# Patient Record
Sex: MALE | Race: WHITE | NOT HISPANIC OR LATINO | Employment: FULL TIME | URBAN - METROPOLITAN AREA
[De-identification: names, ages, dates, MRNs, and addresses within clinical notes are randomized per-mention and may not be internally consistent; named-entity substitution may affect disease eponyms.]

---

## 2017-12-01 ENCOUNTER — TELEPHONE (OUTPATIENT)
Dept: FAMILY MEDICINE | Facility: CLINIC | Age: 22
End: 2017-12-01

## 2017-12-01 NOTE — TELEPHONE ENCOUNTER
----- Message from Yamila Daniel sent at 12/1/2017  3:00 PM CST -----  Patient states that he will be a new patient and need to see the doctor/NP today for blood in urine.  Please call patient at [phone]

## 2017-12-01 NOTE — TELEPHONE ENCOUNTER
Left detailed message explaining there are no available appointments this afternoon. That we hold UC clinic on Saturday morning here, and should call in the morning for possible appointment. If he feels that he cannot wait until the morning he should seek UC this evening.

## 2017-12-04 ENCOUNTER — LAB VISIT (OUTPATIENT)
Dept: LAB | Facility: HOSPITAL | Age: 22
End: 2017-12-04
Attending: UROLOGY
Payer: COMMERCIAL

## 2017-12-04 ENCOUNTER — OFFICE VISIT (OUTPATIENT)
Dept: UROLOGY | Facility: CLINIC | Age: 22
End: 2017-12-04
Payer: COMMERCIAL

## 2017-12-04 ENCOUNTER — HOSPITAL ENCOUNTER (OUTPATIENT)
Dept: RADIOLOGY | Facility: HOSPITAL | Age: 22
Discharge: HOME | End: 2017-12-04
Attending: UROLOGY
Payer: COMMERCIAL

## 2017-12-04 VITALS
DIASTOLIC BLOOD PRESSURE: 66 MMHG | WEIGHT: 134.5 LBS | HEART RATE: 61 BPM | SYSTOLIC BLOOD PRESSURE: 119 MMHG | HEIGHT: 70 IN | BODY MASS INDEX: 19.26 KG/M2

## 2017-12-04 DIAGNOSIS — E29.1 HYPOGONADISM IN MALE: ICD-10-CM

## 2017-12-04 DIAGNOSIS — R31.0 HEMATURIA, GROSS: ICD-10-CM

## 2017-12-04 DIAGNOSIS — R31.0 HEMATURIA, GROSS: Primary | ICD-10-CM

## 2017-12-04 LAB
ANION GAP SERPL CALC-SCNC: 5 MMOL/L
BILIRUB UR QL STRIP: NEGATIVE
BUN SERPL-MCNC: 13 MG/DL
CALCIUM SERPL-MCNC: 9.2 MG/DL
CHLORIDE SERPL-SCNC: 105 MMOL/L
CLARITY UR REFRACT.AUTO: CLEAR
CO2 SERPL-SCNC: 29 MMOL/L
COLOR UR AUTO: YELLOW
CREAT SERPL-MCNC: 1 MG/DL
EST. GFR  (AFRICAN AMERICAN): >60 ML/MIN/1.73 M^2
EST. GFR  (NON AFRICAN AMERICAN): >60 ML/MIN/1.73 M^2
GLUCOSE SERPL-MCNC: 68 MG/DL
GLUCOSE UR QL STRIP: NEGATIVE
HGB UR QL STRIP: NEGATIVE
KETONES UR QL STRIP: NEGATIVE
LEUKOCYTE ESTERASE UR QL STRIP: NEGATIVE
NITRITE UR QL STRIP: NEGATIVE
PH UR STRIP: 5 [PH] (ref 5–8)
POTASSIUM SERPL-SCNC: 4.8 MMOL/L
PROT UR QL STRIP: NEGATIVE
SODIUM SERPL-SCNC: 139 MMOL/L
SP GR UR STRIP: 1.02 (ref 1–1.03)
URN SPEC COLLECT METH UR: NORMAL
UROBILINOGEN UR STRIP-ACNC: NEGATIVE EU/DL

## 2017-12-04 PROCEDURE — 87086 URINE CULTURE/COLONY COUNT: CPT

## 2017-12-04 PROCEDURE — 74178 CT ABD&PLV WO CNTR FLWD CNTR: CPT | Mod: 26,,, | Performed by: RADIOLOGY

## 2017-12-04 PROCEDURE — 84270 ASSAY OF SEX HORMONE GLOBUL: CPT

## 2017-12-04 PROCEDURE — 25500020 PHARM REV CODE 255

## 2017-12-04 PROCEDURE — 99999 PR PBB SHADOW E&M-EST. PATIENT-LVL III: CPT | Mod: PBBFAC,,, | Performed by: UROLOGY

## 2017-12-04 PROCEDURE — 36415 COLL VENOUS BLD VENIPUNCTURE: CPT

## 2017-12-04 PROCEDURE — 80048 BASIC METABOLIC PNL TOTAL CA: CPT

## 2017-12-04 PROCEDURE — 88112 CYTOPATH CELL ENHANCE TECH: CPT | Performed by: PATHOLOGY

## 2017-12-04 PROCEDURE — 74178 CT ABD&PLV WO CNTR FLWD CNTR: CPT | Mod: TC

## 2017-12-04 PROCEDURE — 88112 CYTOPATH CELL ENHANCE TECH: CPT | Mod: 26,,, | Performed by: PATHOLOGY

## 2017-12-04 PROCEDURE — 99203 OFFICE O/P NEW LOW 30 MIN: CPT | Mod: S$GLB,,, | Performed by: UROLOGY

## 2017-12-04 PROCEDURE — 81003 URINALYSIS AUTO W/O SCOPE: CPT

## 2017-12-04 RX ORDER — DEXTROAMPHETAMINE SACCHARATE, AMPHETAMINE ASPARTATE MONOHYDRATE, DEXTROAMPHETAMINE SULFATE AND AMPHETAMINE SULFATE 5; 5; 5; 5 MG/1; MG/1; MG/1; MG/1
20 CAPSULE, EXTENDED RELEASE ORAL EVERY MORNING
COMMUNITY
End: 2019-08-13

## 2017-12-04 RX ADMIN — IOHEXOL 75 ML: 350 INJECTION, SOLUTION INTRAVENOUS at 04:12

## 2017-12-04 NOTE — PROGRESS NOTES
"  Subjective:       Patient ID: Donal Black is a 22 y.o. male.    Chief Complaint:  Hematuria.      History of Present Illness  HPI  Patient is a 22 y.o. male who is new to our clinic and referred by urgent care for evaluation of hematuria.  He reports a 1-2 month of hematospermia.  He then subsequently developed hematuria.  He was evaluated at urgent care who told him he did not have an infection.  Onset of hematuria was Friday.  He had no dysuria.  He also had left testicular discomfort at the time.  Pain is mild--"uncomfortable".   No fevers.  No nausea/vomiting.  Mild left lower quadrant/groin pain.   Denies a h/o kidney stones.  He is a smoker---used to smoke 1ppd for ~10 years.  Now he mostly smokes e-cigarettes.       Of note, he has gynecomastia and previously had been on testosterone injection.  He says he was taking "prohormones" which he describes as essentially steroids.  He was also Novadex and arimidex.  He has not been on these for ~ 1year.        Review of Systems  Review of Systems  All other systems reviewed and negative except pertinent positives noted in HPI.       Objective:     Physical Exam   Nursing note and vitals reviewed.  Constitutional: He is oriented to person, place, and time. Vital signs are normal. He appears well-developed and well-nourished. He is cooperative.   HENT:   Head: Normocephalic.   Neck: No tracheal deviation present.   Cardiovascular: Normal rate and intact distal pulses.    Pulmonary/Chest: Effort normal. No accessory muscle usage. No tachypnea. No respiratory distress.   Abdominal: Soft. Normal appearance. He exhibits no distension, no fluid wave, no ascites and no mass. There is no tenderness. There is no rebound and no CVA tenderness. No hernia. Hernia confirmed negative in the right inguinal area and confirmed negative in the left inguinal area.   Liver/spleen non-palpable.   Genitourinary: Prostate normal, testes normal and penis normal. Rectal exam shows no " external hemorrhoid, no mass, no tenderness and anal tone normal. Prostate is not tender. Right testis shows no mass and no tenderness. Right testis is descended. Left testis shows no mass and no tenderness. Left testis is descended. Circumcised.   Genitourinary Comments: Scrotum showed no rashes or lesions.  Anus/perineum without lesion.  Epididymis showed no masses or tenderness. No meatal stenosis, meatus in normal location. No penile discharge/plaques. james deferred     Musculoskeletal: Normal range of motion.   Lymphadenopathy: No inguinal adenopathy noted on the right or left side.        Right: No inguinal adenopathy present.        Left: No inguinal adenopathy present.   Neurological: He is alert and oriented to person, place, and time. He has normal strength.   Skin: No bruising and no rash noted.     Psychiatric: He has a normal mood and affect. His speech is normal and behavior is normal. Thought content normal.       Lab Review  No results found for: COLORU, SPECGRAV, PHUR, WBCUR, NITRITE, PROTEINUR, GLUCOSEUR, KETONESU, UROBILINOGEN, BILIRUBINUR, RBCUR      Assessment:        1. Hematuria, gross          Plan:     Hematuria, gross    -The patient will be set up for a hematuria workup to include a CT urogram, urine cytology, cystoscopy and urine culture.  A BMP will be ordered if not done in the last 60 days.  The risks and benefits of cystoscopy were discussed with the patient.     -unfortunately, the patient is refusing cystoscopy at this time, even under general anesthesia.

## 2017-12-05 ENCOUNTER — TELEPHONE (OUTPATIENT)
Dept: UROLOGY | Facility: CLINIC | Age: 22
End: 2017-12-05

## 2017-12-05 LAB — BACTERIA UR CULT: NO GROWTH

## 2017-12-05 NOTE — TELEPHONE ENCOUNTER
----- Message from Gerard Diego LPN sent at 12/5/2017  9:24 AM CST -----  Contact: self  [phone]      ----- Message -----  From: Dolores Peck MA  Sent: 12/5/2017   8:54 AM  To: Julian Jay Staff    States he is calling for his test results.

## 2017-12-05 NOTE — TELEPHONE ENCOUNTER
Called patient and notified of the results.  I recommended cystoscopy--he will call if/when he is ready to schedule this.

## 2017-12-08 LAB
ALBUMIN SERPL-MCNC: 4.2 G/DL (ref 3.6–5.1)
SHBG SERPL-SCNC: 54 NMOL/L (ref 10–50)
TESTOST FREE SERPL-MCNC: 31.1 PG/ML (ref 46–224)
TESTOST SERPL-MCNC: 363 NG/DL (ref 250–1100)
TESTOSTERONE.FREE+WB SERPL-MCNC: 60 NG/DL (ref 110–575)

## 2019-08-03 ENCOUNTER — HOSPITAL ENCOUNTER (EMERGENCY)
Facility: HOSPITAL | Age: 24
Discharge: HOME | End: 2019-08-03
Attending: EMERGENCY MEDICINE
Payer: COMMERCIAL

## 2019-08-03 VITALS
HEIGHT: 70 IN | BODY MASS INDEX: 21.47 KG/M2 | OXYGEN SATURATION: 100 % | HEART RATE: 78 BPM | WEIGHT: 150 LBS | RESPIRATION RATE: 18 BRPM | SYSTOLIC BLOOD PRESSURE: 118 MMHG | TEMPERATURE: 98 F | DIASTOLIC BLOOD PRESSURE: 79 MMHG
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
## 2019-08-03 DIAGNOSIS — S62.366A CLOSED NONDISPLACED FRACTURE OF NECK OF FIFTH METACARPAL BONE OF RIGHT HAND, INITIAL ENCOUNTER: ICD-10-CM
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
## 2019-08-03 DIAGNOSIS — M25.519 SHOULDER PAIN: ICD-10-CM

## 2019-08-03 DIAGNOSIS — R07.9 CHEST PAIN: ICD-10-CM

## 2019-08-03 PROCEDURE — 99284 EMERGENCY DEPT VISIT MOD MDM: CPT

## 2019-08-03 PROCEDURE — 29125 APPL SHORT ARM SPLINT STATIC: CPT | Mod: RT

## 2019-08-03 PROCEDURE — 93005 ELECTROCARDIOGRAM TRACING: CPT

## 2019-08-03 NOTE — ED NOTES
Splint in place and rechecked by MD.  Brisk cap refill at site. Asks and given work note.  Ambulatory.
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
## 2019-08-12 ENCOUNTER — TELEPHONE (OUTPATIENT)
Dept: ORTHOPEDICS | Facility: CLINIC | Age: 24
End: 2019-08-12
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
## 2019-08-12 NOTE — TELEPHONE ENCOUNTER
Patient stated he is calling around for a sooner appointment. He has been instructed to reach out to us if he is unsuccessful. Patient verbalized understanding; no other issues discussed.
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
## 2019-08-13 ENCOUNTER — OFFICE VISIT (OUTPATIENT)
Dept: ORTHOPEDICS | Facility: CLINIC | Age: 24
End: 2019-08-13
Payer: COMMERCIAL

## 2019-08-13 VITALS
WEIGHT: 149 LBS | DIASTOLIC BLOOD PRESSURE: 70 MMHG | HEIGHT: 70 IN | HEART RATE: 118 BPM | BODY MASS INDEX: 21.33 KG/M2 | SYSTOLIC BLOOD PRESSURE: 118 MMHG

## 2019-08-13 DIAGNOSIS — S62.366D CLOSED NONDISPLACED FRACTURE OF NECK OF FIFTH METACARPAL BONE OF RIGHT HAND WITH ROUTINE HEALING, SUBSEQUENT ENCOUNTER: Primary | ICD-10-CM

## 2019-08-13 PROCEDURE — 99203 PR OFFICE/OUTPT VISIT, NEW, LEVL III, 30-44 MIN: ICD-10-PCS | Mod: S$GLB,,, | Performed by: ORTHOPAEDIC SURGERY

## 2019-08-13 PROCEDURE — 3008F BODY MASS INDEX DOCD: CPT | Mod: S$GLB,,, | Performed by: ORTHOPAEDIC SURGERY

## 2019-08-13 PROCEDURE — 3008F PR BODY MASS INDEX (BMI) DOCUMENTED: ICD-10-PCS | Mod: S$GLB,,, | Performed by: ORTHOPAEDIC SURGERY

## 2019-08-13 PROCEDURE — 99203 OFFICE O/P NEW LOW 30 MIN: CPT | Mod: S$GLB,,, | Performed by: ORTHOPAEDIC SURGERY
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
## 2019-08-13 NOTE — LETTER
August 13, 2019                 [facility]  [address]  [place] [address]  Phone: [phone]  Fax: [phone] August 13, 2019     Patient: Donal Black    YOB: 1995   Date of Visit: 8/13/2019       To Whom It May Concern:    It is my medical opinion that Donal Black  may return to full working duty immediately with no restrictions.    If you have any questions or concerns, please don't hesitate to call.    Sincerely,        Donald Peña MD

## 2019-08-22 ENCOUNTER — OFFICE VISIT (OUTPATIENT)
Dept: ORTHOPEDICS | Facility: CLINIC | Age: 24
End: 2019-08-22
Payer: COMMERCIAL

## 2019-08-22 VITALS
DIASTOLIC BLOOD PRESSURE: 74 MMHG | BODY MASS INDEX: 21.9 KG/M2 | SYSTOLIC BLOOD PRESSURE: 128 MMHG | WEIGHT: 153 LBS | HEIGHT: 70 IN | HEART RATE: 99 BPM

## 2019-08-22 DIAGNOSIS — S62.366D CLOSED NONDISPLACED FRACTURE OF NECK OF FIFTH METACARPAL BONE OF RIGHT HAND WITH ROUTINE HEALING, SUBSEQUENT ENCOUNTER: Primary | ICD-10-CM

## 2019-08-22 DIAGNOSIS — S63.591A TFCC (TRIANGULAR FIBROCARTILAGE COMPLEX) TEAR, RIGHT, INITIAL ENCOUNTER: ICD-10-CM

## 2019-08-22 DIAGNOSIS — G56.21 ULNAR NEURITIS, RIGHT: ICD-10-CM

## 2019-08-22 PROCEDURE — 99213 PR OFFICE/OUTPT VISIT, EST, LEVL III, 20-29 MIN: ICD-10-PCS | Mod: S$GLB,,, | Performed by: ORTHOPAEDIC SURGERY

## 2019-08-22 PROCEDURE — 3008F BODY MASS INDEX DOCD: CPT | Mod: S$GLB,,, | Performed by: ORTHOPAEDIC SURGERY

## 2019-08-22 PROCEDURE — 99213 OFFICE O/P EST LOW 20 MIN: CPT | Mod: S$GLB,,, | Performed by: ORTHOPAEDIC SURGERY

## 2019-08-22 PROCEDURE — 3008F PR BODY MASS INDEX (BMI) DOCUMENTED: ICD-10-PCS | Mod: S$GLB,,, | Performed by: ORTHOPAEDIC SURGERY
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
## 2019-08-22 NOTE — LETTER
August 22, 2019      [facility]  [address]  [place] [address]  Phone: [phone]  Fax: [phone]       Patient: Donal Black   YOB: 1995  Date of Visit: 08/22/2019    To Whom It May Concern:    Joann Black  was at our office on 08/22/2019. He may not return to work at this time.  If you have any questions or concerns, or if I can be of further assistance, please do not hesitate to contact me.    Sincerely,    Donald Peña MD

## 2019-08-26 ENCOUNTER — TELEPHONE (OUTPATIENT)
Dept: PHYSICAL MEDICINE AND REHAB | Facility: CLINIC | Age: 24
End: 2019-08-26

## 2019-08-26 NOTE — TELEPHONE ENCOUNTER
Attempted to call patient 8/22/19, 8/23/19, 8/26/19 to schedule an EMG.  Left messages on voicemail.

## 2019-08-28 ENCOUNTER — HOSPITAL ENCOUNTER (OUTPATIENT)
Dept: RADIOLOGY | Facility: HOSPITAL | Age: 24
Discharge: HOME | End: 2019-08-28
Attending: ORTHOPAEDIC SURGERY
Payer: COMMERCIAL

## 2019-08-28 DIAGNOSIS — S62.366D CLOSED NONDISPLACED FRACTURE OF NECK OF FIFTH METACARPAL BONE OF RIGHT HAND WITH ROUTINE HEALING, SUBSEQUENT ENCOUNTER: ICD-10-CM

## 2019-08-28 DIAGNOSIS — S63.591A TFCC (TRIANGULAR FIBROCARTILAGE COMPLEX) TEAR, RIGHT, INITIAL ENCOUNTER: ICD-10-CM

## 2019-08-28 DIAGNOSIS — G56.21 ULNAR NEURITIS, RIGHT: ICD-10-CM

## 2019-08-28 PROCEDURE — 73218 MRI UPPER EXTREMITY W/O DYE: CPT | Mod: TC,PO,RT

## 2019-10-01 ENCOUNTER — TELEPHONE (OUTPATIENT)
Dept: ORTHOPEDICS | Facility: CLINIC | Age: 24
End: 2019-10-01
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
## 2019-10-02 ENCOUNTER — TELEPHONE (OUTPATIENT)
Dept: ORTHOPEDICS | Facility: CLINIC | Age: 24
End: 2019-10-02

## 2019-10-02 NOTE — TELEPHONE ENCOUNTER
----- Message from Yaima Mathur sent at 10/1/2019 11:35 AM CDT -----  Contact: Patient   Please call patient he needs a return to work status. Patient has not had his NCV yet.

## 2019-10-02 NOTE — TELEPHONE ENCOUNTER
Spoke with pt, he never had his NCS yet, has another appointment on 10/28. Also needs a note stating why he was coming to us.

## 2019-10-29 ENCOUNTER — OFFICE VISIT (OUTPATIENT)
Dept: PHYSICAL MEDICINE AND REHAB | Facility: CLINIC | Age: 24
End: 2019-10-29
Payer: COMMERCIAL

## 2019-10-29 DIAGNOSIS — S63.591A TFCC (TRIANGULAR FIBROCARTILAGE COMPLEX) TEAR, RIGHT, INITIAL ENCOUNTER: ICD-10-CM

## 2019-10-29 DIAGNOSIS — G56.21 ULNAR NEURITIS, RIGHT: ICD-10-CM

## 2019-10-29 DIAGNOSIS — S62.366D CLOSED NONDISPLACED FRACTURE OF NECK OF FIFTH METACARPAL BONE OF RIGHT HAND WITH ROUTINE HEALING, SUBSEQUENT ENCOUNTER: ICD-10-CM

## 2019-10-29 PROCEDURE — 99499 NO LOS: ICD-10-PCS | Mod: S$GLB,,, | Performed by: PHYSICAL MEDICINE & REHABILITATION

## 2019-10-29 PROCEDURE — 95908 NRV CNDJ TST 3-4 STUDIES: CPT | Mod: S$GLB,,, | Performed by: PHYSICAL MEDICINE & REHABILITATION

## 2019-10-29 PROCEDURE — 95908 PR NERVE CONDUCTION STUDY; 3-4 STUDIES: ICD-10-PCS | Mod: S$GLB,,, | Performed by: PHYSICAL MEDICINE & REHABILITATION

## 2019-10-29 PROCEDURE — 99499 UNLISTED E&M SERVICE: CPT | Mod: S$GLB,,, | Performed by: PHYSICAL MEDICINE & REHABILITATION

## 2019-10-29 NOTE — LETTER
October 29, 2019      Donald Peña MD  [address]  [place] [address]           [facility]  [address] [place] [address]  [address]  [place] [address]  Phone: [phone]  Fax: [phone]          Patient: Donal Black   MR Number: [id number]   YOB: 1995   Date of Visit: 10/29/2019       Dear Dr. Donald Peña:    Thank you for referring Donal Black to me for evaluation. Attached you will find relevant portions of my assessment and plan of care.    If you have questions, please do not hesitate to call me. I look forward to following Donal Black along with you.    Sincerely,    Phyllis Beckham,     Enclosure  CC:  No Recipients    If you would like to receive this communication electronically, please contact [email or web address] or [phone] to request more information on [employer] access.    For providers and/or their staff who would like to refer a patient to [facility], please contact us through our one-stop-shop provider referral line, [facility] , at [phone].    If you feel you have received this communication in error or would no longer like to receive these types of communications, please e-mail [email or web address]

## 2019-10-29 NOTE — PROGRESS NOTES
[facility]  Physical Medicine and Rehabilitation   [address] [place] [address], [address]  [place] [address]             Patient: Donal Black   Patient ID: [id number]   Sex:     Date of Birth:     Age:     Notes:     Last visit date: 10/29/2019         Visit date and time: 10/29/2019 12:21   Patient Age on Visit Date:     Referring Physician:     Diagnoses:         Right hand numbness  Sensory NCS      Nerve / Sites Rec. Site Onset Lat Peak Lat Ref. NP Amp Ref. PP Amp Ref. Segments Distance Velocity     ms ms ms µV µV µV µV  cm m/s   R Median - Digit II (Antidromic)      Wrist Dig II 3.44 4.58 ?3.40 15.3 ?15.0 36.6 ?20.0 Wrist - Dig II 13 38   R Ulnar - Digit V (Antidromic)      Wrist Dig V 2.34 3.07 ?3.10 22.6 ?10.0 44.1 ?15.0 Wrist - Dig V 11 47       Motor NCS      Nerve / Sites Muscle Latency Ref. Amplitude Ref. Amp % Duration Segments Distance Lat Diff Velocity Ref.     ms ms mV mV % ms  cm ms m/s m/s   R Median - APB      Wrist APB 4.38 ?4.40 8.5 ?4.0 100 8.13 Wrist - APB 7         Elbow APB 9.58  6.5  77 8.28 Elbow - Wrist 23 5.21 44 ?49   R Ulnar - ADM      Wrist ADM 3.33 ?3.60 10.4 ?5.0 100 6.46 Wrist - ADM 7         B.Elbow ADM 7.81  10.5  100 6.51 B.Elbow - Wrist 24.5 4.48 55 ?49      A.Elbow ADM 9.48  11.3  108 6.35 A.Elbow - B.Elbow 10 1.67 60 ?49       NOTE- Pt. declined needle EMG study   Summary    The motor conduction test was performed on 2 nerve(s). The results were normal in 1 nerve(s): L Ulnar - ADM. Results outside the specified normal range were found in 1 nerve(s), as follows:   In the L Median - APB study  o the take off velocity result was reduced for Elbow - Wrist segment    The sensory conduction test was performed on 2 nerve(s). The results were normal in 1 nerve(s): L Ulnar - Digit V (Antidromic). Results outside the specified normal range were found in 1 nerve(s), as follows:   In the L Median - Digit II (Antidromic) study  o the peak latency result was increased for  Wrist stimulation  o the peak amplitude result was reduced for Wrist stimulation          Conclusion:       Moderate right carpal tunnel syndrome      NOTE- Pt. declined needle EMG study           ____________________________  Phyllis Pendleton D.O.

## 2021-05-26 ENCOUNTER — OCCUPATIONAL HEALTH (OUTPATIENT)
Dept: URGENT CARE | Facility: CLINIC | Age: 26
End: 2021-05-26

## 2021-05-26 PROCEDURE — 99499 PHYSICAL - BASIC COMPLEXITY: ICD-10-PCS | Mod: S$GLB,,, | Performed by: EMERGENCY MEDICINE

## 2021-05-26 PROCEDURE — 80305 PR NON-DOT DRUG SCREENS: ICD-10-PCS | Mod: S$GLB,,, | Performed by: EMERGENCY MEDICINE

## 2021-05-26 PROCEDURE — 99499 UNLISTED E&M SERVICE: CPT | Mod: S$GLB,,, | Performed by: EMERGENCY MEDICINE

## 2021-05-26 PROCEDURE — 80305 DRUG TEST PRSMV DIR OPT OBS: CPT | Mod: S$GLB,,, | Performed by: EMERGENCY MEDICINE

## 2022-03-02 ENCOUNTER — OFFICE VISIT (OUTPATIENT)
Dept: FAMILY MEDICINE | Facility: HOSPITAL | Age: 27
End: 2022-03-02
Attending: FAMILY MEDICINE
Payer: MEDICAID

## 2022-03-02 ENCOUNTER — LAB VISIT (OUTPATIENT)
Dept: LAB | Facility: HOSPITAL | Age: 27
End: 2022-03-02
Attending: FAMILY MEDICINE
Payer: MEDICAID

## 2022-03-02 VITALS
WEIGHT: 175.06 LBS | HEIGHT: 70 IN | DIASTOLIC BLOOD PRESSURE: 89 MMHG | SYSTOLIC BLOOD PRESSURE: 146 MMHG | HEART RATE: 112 BPM | BODY MASS INDEX: 25.06 KG/M2

## 2022-03-02 DIAGNOSIS — R03.0 ELEVATED BLOOD PRESSURE READING WITHOUT DIAGNOSIS OF HYPERTENSION: ICD-10-CM

## 2022-03-02 DIAGNOSIS — Z11.59 ENCOUNTER FOR HEPATITIS C SCREENING TEST FOR LOW RISK PATIENT: ICD-10-CM

## 2022-03-02 DIAGNOSIS — Z11.4 ENCOUNTER FOR SCREENING FOR HIV: ICD-10-CM

## 2022-03-02 DIAGNOSIS — M77.01 MEDIAL EPICONDYLITIS OF RIGHT ELBOW: ICD-10-CM

## 2022-03-02 DIAGNOSIS — G56.01 CARPAL TUNNEL SYNDROME OF RIGHT WRIST: Primary | ICD-10-CM

## 2022-03-02 DIAGNOSIS — G56.01 CARPAL TUNNEL SYNDROME OF RIGHT WRIST: ICD-10-CM

## 2022-03-02 LAB
ALBUMIN SERPL BCP-MCNC: 4 G/DL (ref 3.5–5.2)
ALP SERPL-CCNC: 85 U/L (ref 55–135)
ALT SERPL W/O P-5'-P-CCNC: 45 U/L (ref 10–44)
ANION GAP SERPL CALC-SCNC: 11 MMOL/L (ref 8–16)
AST SERPL-CCNC: 44 U/L (ref 10–40)
BASOPHILS # BLD AUTO: 0.09 K/UL (ref 0–0.2)
BASOPHILS NFR BLD: 1.3 % (ref 0–1.9)
BILIRUB SERPL-MCNC: 0.3 MG/DL (ref 0.1–1)
BUN SERPL-MCNC: 10 MG/DL (ref 6–20)
CALCIUM SERPL-MCNC: 9.1 MG/DL (ref 8.7–10.5)
CHLORIDE SERPL-SCNC: 106 MMOL/L (ref 95–110)
CHOLEST SERPL-MCNC: 146 MG/DL (ref 120–199)
CHOLEST/HDLC SERPL: 3.2 {RATIO} (ref 2–5)
CO2 SERPL-SCNC: 24 MMOL/L (ref 23–29)
CREAT SERPL-MCNC: 1.4 MG/DL (ref 0.5–1.4)
DIFFERENTIAL METHOD: ABNORMAL
EOSINOPHIL # BLD AUTO: 0.4 K/UL (ref 0–0.5)
EOSINOPHIL NFR BLD: 6.3 % (ref 0–8)
ERYTHROCYTE [DISTWIDTH] IN BLOOD BY AUTOMATED COUNT: 12.3 % (ref 11.5–14.5)
EST. GFR  (AFRICAN AMERICAN): >60 ML/MIN/1.73 M^2
EST. GFR  (NON AFRICAN AMERICAN): >60 ML/MIN/1.73 M^2
ESTIMATED AVG GLUCOSE: 88 MG/DL (ref 68–131)
FOLATE SERPL-MCNC: 7.8 NG/ML (ref 4–24)
GLUCOSE SERPL-MCNC: 72 MG/DL (ref 70–110)
HBA1C MFR BLD: 4.7 % (ref 4–5.6)
HCT VFR BLD AUTO: 48.5 % (ref 40–54)
HDLC SERPL-MCNC: 46 MG/DL (ref 40–75)
HDLC SERPL: 31.5 % (ref 20–50)
HGB BLD-MCNC: 16.2 G/DL (ref 14–18)
IMM GRANULOCYTES # BLD AUTO: 0.06 K/UL (ref 0–0.04)
IMM GRANULOCYTES NFR BLD AUTO: 0.9 % (ref 0–0.5)
LDLC SERPL CALC-MCNC: 89.2 MG/DL (ref 63–159)
LYMPHOCYTES # BLD AUTO: 2.1 K/UL (ref 1–4.8)
LYMPHOCYTES NFR BLD: 30.5 % (ref 18–48)
MCH RBC QN AUTO: 31.8 PG (ref 27–31)
MCHC RBC AUTO-ENTMCNC: 33.4 G/DL (ref 32–36)
MCV RBC AUTO: 95 FL (ref 82–98)
MONOCYTES # BLD AUTO: 0.9 K/UL (ref 0.3–1)
MONOCYTES NFR BLD: 12.8 % (ref 4–15)
NEUTROPHILS # BLD AUTO: 3.3 K/UL (ref 1.8–7.7)
NEUTROPHILS NFR BLD: 48.2 % (ref 38–73)
NONHDLC SERPL-MCNC: 100 MG/DL
NRBC BLD-RTO: 0 /100 WBC
PLATELET # BLD AUTO: 326 K/UL (ref 150–450)
PMV BLD AUTO: 9.3 FL (ref 9.2–12.9)
POTASSIUM SERPL-SCNC: 4.5 MMOL/L (ref 3.5–5.1)
PROT SERPL-MCNC: 7 G/DL (ref 6–8.4)
RBC # BLD AUTO: 5.09 M/UL (ref 4.6–6.2)
SODIUM SERPL-SCNC: 141 MMOL/L (ref 136–145)
TRIGL SERPL-MCNC: 54 MG/DL (ref 30–150)
TSH SERPL DL<=0.005 MIU/L-ACNC: 0.58 UIU/ML (ref 0.4–4)
VIT B12 SERPL-MCNC: 1102 PG/ML (ref 210–950)
WBC # BLD AUTO: 6.78 K/UL (ref 3.9–12.7)

## 2022-03-02 PROCEDURE — 36415 COLL VENOUS BLD VENIPUNCTURE: CPT | Performed by: STUDENT IN AN ORGANIZED HEALTH CARE EDUCATION/TRAINING PROGRAM

## 2022-03-02 PROCEDURE — 87389 HIV-1 AG W/HIV-1&-2 AB AG IA: CPT | Performed by: STUDENT IN AN ORGANIZED HEALTH CARE EDUCATION/TRAINING PROGRAM

## 2022-03-02 PROCEDURE — 82746 ASSAY OF FOLIC ACID SERUM: CPT | Performed by: STUDENT IN AN ORGANIZED HEALTH CARE EDUCATION/TRAINING PROGRAM

## 2022-03-02 PROCEDURE — 83036 HEMOGLOBIN GLYCOSYLATED A1C: CPT | Performed by: STUDENT IN AN ORGANIZED HEALTH CARE EDUCATION/TRAINING PROGRAM

## 2022-03-02 PROCEDURE — 84443 ASSAY THYROID STIM HORMONE: CPT | Performed by: STUDENT IN AN ORGANIZED HEALTH CARE EDUCATION/TRAINING PROGRAM

## 2022-03-02 PROCEDURE — 86803 HEPATITIS C AB TEST: CPT | Performed by: STUDENT IN AN ORGANIZED HEALTH CARE EDUCATION/TRAINING PROGRAM

## 2022-03-02 PROCEDURE — 82607 VITAMIN B-12: CPT | Performed by: STUDENT IN AN ORGANIZED HEALTH CARE EDUCATION/TRAINING PROGRAM

## 2022-03-02 PROCEDURE — 85025 COMPLETE CBC W/AUTO DIFF WBC: CPT | Performed by: STUDENT IN AN ORGANIZED HEALTH CARE EDUCATION/TRAINING PROGRAM

## 2022-03-02 PROCEDURE — 99213 OFFICE O/P EST LOW 20 MIN: CPT | Performed by: STUDENT IN AN ORGANIZED HEALTH CARE EDUCATION/TRAINING PROGRAM

## 2022-03-02 PROCEDURE — 80053 COMPREHEN METABOLIC PANEL: CPT | Performed by: STUDENT IN AN ORGANIZED HEALTH CARE EDUCATION/TRAINING PROGRAM

## 2022-03-02 PROCEDURE — 80061 LIPID PANEL: CPT | Performed by: STUDENT IN AN ORGANIZED HEALTH CARE EDUCATION/TRAINING PROGRAM

## 2022-03-02 RX ORDER — GABAPENTIN 300 MG/1
300 CAPSULE ORAL NIGHTLY
Qty: 60 CAPSULE | Refills: 1 | Status: SHIPPED | OUTPATIENT
Start: 2022-03-02 | End: 2022-12-01

## 2022-03-02 NOTE — PROGRESS NOTES
"Progress Note  [facility] Family [facility]    Subjective:     Donal Black is a 26 y.o. year old male with PMHx of carpal tunnel on R who presented to clinic today to establish care. Patient reports that he is overall doing well. He does have concerns regarding carpal tunnel. Previously diagnosed via EMG on the R side. Continues to endorse electric/burning pain in the R wrist, R first three fingers, and radiating up about 1/3 up the forearm. Painful on palpation of the R wrist. Pain is significantly worse at night; needs to wake up and shake his hand out to make the pain resolve. He reports that he has used a brace consistently in the past without significant improvement. Patient also with occasional pain in the 4th and 5th fingers. Reports hx of trauma to the medial aspect of the R elbow in the past. Pain in the 4th and 5th fingers followed this. Patient also with concern regarding pain in the R shoulder. Describes a specific sore spot that worsens with physical activity. Patient recently returned to the gym after a long period of not working out. He reports that he has been trying different "peptides" to help with the pain. 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 Patient without any other concerns at this time.       Review of Systems   Constitutional: Negative for chills, fever and malaise/fatigue.   Respiratory: Negative for cough and shortness of breath.    Cardiovascular: Negative for chest pain.   Gastrointestinal: Negative for abdominal pain, constipation, diarrhea, nausea and vomiting.   Genitourinary: Negative for dysuria.   Musculoskeletal: Positive for myalgias.   Neurological: Negative for weakness and headaches.       Objective:     Vitals:    03/02/22 0906   BP: (!) 146/89   Pulse: (!) 112       Wt Readings from Last 1 Encounters:   03/02/22 79.4 kg (175 lb 0.7 oz)       Physical Exam  Constitutional:       General: He is not in acute distress.     Appearance: Normal appearance. He is not " ill-appearing.   HENT:      Head: Normocephalic and atraumatic.      Mouth/Throat:      Mouth: Mucous membranes are moist.   Eyes:      Extraocular Movements: Extraocular movements intact.      Pupils: Pupils are equal, round, and reactive to light.   Pulmonary:      Effort: Pulmonary effort is normal. No respiratory distress.   Abdominal:      General: Abdomen is flat. There is no distension.   Musculoskeletal:         General: Normal range of motion.      Cervical back: Normal range of motion.      Comments: + tinel, - phalen on R   Skin:     General: Skin is warm and dry.   Neurological:      Mental Status: He is alert and oriented to person, place, and time.         Assessment/Plan:     Donal was seen today for establish care.    Diagnoses and all orders for this visit:    Carpal tunnel syndrome of right wrist  -     gabapentin (NEURONTIN) 300 MG capsule; Take 1 capsule (300 mg total) by mouth every evening.  -     CBC Auto Differential; Future  -     TSH; Future  -     Hemoglobin A1C; Future  -     Vitamin B12; Future  -     Folate; Future  - Patient with previously diagnosed R carpal tunnel. Discussed using a brace. Discussed symptomatic treatment with NSAIDs/tylenol. Start on gabapentin as he felt that brace was not helpful. If fails gabapentin, can consider injection and/or referral to surgery.     Medial epicondylitis of right elbow        - Patient with symptoms and hx consistent with medial epicondylitis. Discussed symptomatic treatment.     Elevated blood pressure reading without diagnosis of hypertension  -     Comprehensive Metabolic Panel; Future  -     Lipid Panel; Future    Encounter for screening for HIV  -     HIV 1/2 Ag/Ab (4th Gen); Future    Encounter for hepatitis C screening test for low risk patient  -     Hepatitis C Antibody; Future        Follow up in about 4 weeks if symptoms worsen or fail to improve.    Case discussed with staff: Dr. Aidan Lozada MD PGY-3  [facility]   03/02/2022 9:36 AM    This note was partially created using [employer] Voice Recognition software. Typographical and content errors may occur with this process. While efforts are made to detect and correct such errors, in some cases errors will persist. For this reason, wording in this document should be considered in the proper context and not strictly verbatim.

## 2022-03-04 LAB
HCV AB SERPL QL IA: NEGATIVE
HIV 1+2 AB+HIV1 P24 AG SERPL QL IA: NEGATIVE

## 2022-06-07 ENCOUNTER — TELEPHONE (OUTPATIENT)
Dept: FAMILY MEDICINE | Facility: HOSPITAL | Age: 27
End: 2022-06-07
Payer: MEDICAID

## 2022-06-07 DIAGNOSIS — G56.01 CARPAL TUNNEL SYNDROME OF RIGHT WRIST: Primary | ICD-10-CM

## 2022-06-07 NOTE — TELEPHONE ENCOUNTER
----- Message from Annmarie Collins sent at 6/6/2022 10:39 AM CDT -----  Pt called in asking for a referral carpal tunnel surgery in [place], [place].Pt ask for a call back.

## 2022-06-07 NOTE — TELEPHONE ENCOUNTER
----- Message from Annmarie Collins sent at 6/6/2022 10:39 AM CDT -----  Pt called in asking for a referral carpal tunnel surgery in [place], [place].Pt ask for a call back.

## 2022-06-09 NOTE — TELEPHONE ENCOUNTER
Called patient to discuss. Was having severe neuro/psych side effects from gabapentin and didn't feel like it was helpful in controlling his symptoms. Put in referral for orthopedics for operative management.     Ethan Lozada MD PGY-3  \[facility]\"" Family Medicine   06/09/2022 5:01 PM

## 2022-06-10 ENCOUNTER — TELEPHONE (OUTPATIENT)
Dept: FAMILY MEDICINE | Facility: HOSPITAL | Age: 27
End: 2022-06-10
Payer: MEDICAID

## 2022-07-13 ENCOUNTER — HOSPITAL ENCOUNTER (EMERGENCY)
Facility: HOSPITAL | Age: 27
Discharge: HOME | End: 2022-07-13
Attending: EMERGENCY MEDICINE
Payer: MEDICAID

## 2022-07-13 VITALS
RESPIRATION RATE: 17 BRPM | HEIGHT: 71 IN | HEART RATE: 108 BPM | SYSTOLIC BLOOD PRESSURE: 165 MMHG | TEMPERATURE: 98 F | WEIGHT: 170 LBS | DIASTOLIC BLOOD PRESSURE: 99 MMHG | OXYGEN SATURATION: 100 % | BODY MASS INDEX: 23.8 KG/M2

## 2022-07-13 DIAGNOSIS — M25.511 SHOULDER PAIN, RIGHT: ICD-10-CM

## 2022-07-13 PROCEDURE — 99283 EMERGENCY DEPT VISIT LOW MDM: CPT

## 2022-07-13 RX ORDER — DICLOFENAC SODIUM 75 MG/1
75 TABLET, DELAYED RELEASE ORAL 2 TIMES DAILY
Qty: 14 TABLET | Refills: 0 | Status: SHIPPED | OUTPATIENT
Start: 2022-07-13 | End: 2022-12-01

## 2022-07-13 NOTE — Clinical Note
"Donal Florblanche Black was seen and treated in our emergency department on 7/13/2022.  He may return to work on 07/14/2022.  Light duty until cleared by an orthopedist.     If you have any questions or concerns, please don't hesitate to call.      Conner Bunn, LANN RN    "

## 2022-07-13 NOTE — ED PROVIDER NOTES
Encounter Date: 7/13/2022       History     Chief Complaint   Patient presents with    Shoulder Pain     Pt here with c/o right shoulder pain without trauma x 2 weeks with pain while raising above his shoulder and use. Pt reports Hx of nerve damage in the same elbow.      Patient here with reported right shoulder pain patient reports that he has been suffering from tendinitis carpal tunnel in the right arm elbow region states injury while working out does report that there was possible surgery in the future he denies any known injury to the shoulder began having pain in the anterior and posterior aspect of the right shoulder that does radiate down into the biceps region        Review of patient's allergies indicates:  No Known Allergies  No past medical history on file.  No past surgical history on file.  Family History   Problem Relation Age of Onset    Hypertension Father     Thyroid disease Mother      Social History     Tobacco Use    Smoking status: Current Some Day Smoker    Smokeless tobacco: Never Used   Substance Use Topics    Alcohol use: No    Drug use: No     Review of Systems   Musculoskeletal: Positive for arthralgias.   All other systems reviewed and are negative.      Physical Exam     Initial Vitals [07/13/22 0945]   BP Pulse Resp Temp SpO2   (!) 165/99 108 17 98.2 °F (36.8 °C) 100 %      MAP       --         Physical Exam    Constitutional: He appears well-developed and well-nourished. No distress.   HENT:   Head: Normocephalic and atraumatic.   Cardiovascular: Normal rate, regular rhythm and intact distal pulses.   Pulmonary/Chest: No respiratory distress.   Musculoskeletal:         General: Tenderness present. Normal range of motion.      Comments: Tenderness palpation at the anterior and posterior aspect of the right shoulder without palpable crepitance or deformity     Neurological: He is alert and oriented to person, place, and time.   Skin: Skin is warm and dry. Capillary refill takes  less than 2 seconds.         ED Course   Procedures  Labs Reviewed - No data to display       Imaging Results          X-Ray Shoulder Trauma Right (Final result)  Result time 07/13/22 10:57:45    Final result by Po Abdul MD (07/13/22 10:57:45)                 Narrative:    Reason: Shoulder pain.    FINDINGS:    Three views of the right shoulder show no fracture, dislocation, or destructive osseous lesion. Soft tissues are unremarkable.    IMPRESSION:    Unremarkable radiograph of the right shoulder.    Electronically signed by:  Po Abdul DO  7/13/2022 10:57 AM CDT Workstation: [id number]                               Medications - No data to display  Medical Decision Making:   ED Management:  Shoulder radiographs shows no acute abnormalities will place patient on diclofenac recommend he follow-up with his orthopedist for further evaluation moist heat to the area return to ER for any worsened symptoms or new symptoms                      Clinical Impression:   Final diagnoses:  [M25.511] Shoulder pain, right          ED Disposition Condition    Discharge Stable        ED Prescriptions     Medication Sig Dispense Start Date End Date Auth. Provider    diclofenac (VOLTAREN) 75 MG EC tablet Take 1 tablet (75 mg total) by mouth 2 (two) times daily. 14 tablet 7/13/2022  Ta Mensah MD        Follow-up Information    None          Ta Mensah MD  07/13/22 1112

## 2022-10-12 ENCOUNTER — OFFICE VISIT (OUTPATIENT)
Dept: ORTHOPEDICS | Facility: CLINIC | Age: 27
End: 2022-10-12
Payer: MEDICAID

## 2022-10-12 VITALS — BODY MASS INDEX: 26.18 KG/M2 | WEIGHT: 187 LBS | HEIGHT: 71 IN

## 2022-10-12 DIAGNOSIS — G56.21 CUBITAL TUNNEL SYNDROME ON RIGHT: ICD-10-CM

## 2022-10-12 DIAGNOSIS — Z01.818 PRE-OP TESTING: ICD-10-CM

## 2022-10-12 DIAGNOSIS — G56.01 CARPAL TUNNEL SYNDROME OF RIGHT WRIST: ICD-10-CM

## 2022-10-12 PROCEDURE — 99204 OFFICE O/P NEW MOD 45 MIN: CPT | Mod: S$PBB,,, | Performed by: ORTHOPAEDIC SURGERY

## 2022-10-12 PROCEDURE — 3008F PR BODY MASS INDEX (BMI) DOCUMENTED: ICD-10-PCS | Mod: CPTII,,, | Performed by: ORTHOPAEDIC SURGERY

## 2022-10-12 PROCEDURE — 99212 OFFICE O/P EST SF 10 MIN: CPT | Mod: PBBFAC,PN | Performed by: ORTHOPAEDIC SURGERY

## 2022-10-12 PROCEDURE — 99204 PR OFFICE/OUTPT VISIT, NEW, LEVL IV, 45-59 MIN: ICD-10-PCS | Mod: S$PBB,,, | Performed by: ORTHOPAEDIC SURGERY

## 2022-10-12 PROCEDURE — 3008F BODY MASS INDEX DOCD: CPT | Mod: CPTII,,, | Performed by: ORTHOPAEDIC SURGERY

## 2022-10-12 PROCEDURE — 3044F PR MOST RECENT HEMOGLOBIN A1C LEVEL <7.0%: ICD-10-PCS | Mod: CPTII,,, | Performed by: ORTHOPAEDIC SURGERY

## 2022-10-12 PROCEDURE — 99999 PR PBB SHADOW E&M-EST. PATIENT-LVL II: ICD-10-PCS | Mod: PBBFAC,,, | Performed by: ORTHOPAEDIC SURGERY

## 2022-10-12 PROCEDURE — 3044F HG A1C LEVEL LT 7.0%: CPT | Mod: CPTII,,, | Performed by: ORTHOPAEDIC SURGERY

## 2022-10-12 PROCEDURE — 99999 PR PBB SHADOW E&M-EST. PATIENT-LVL II: CPT | Mod: PBBFAC,,, | Performed by: ORTHOPAEDIC SURGERY

## 2022-10-12 PROCEDURE — 1159F PR MEDICATION LIST DOCUMENTED IN MEDICAL RECORD: ICD-10-PCS | Mod: CPTII,,, | Performed by: ORTHOPAEDIC SURGERY

## 2022-10-12 PROCEDURE — 1159F MED LIST DOCD IN RCRD: CPT | Mod: CPTII,,, | Performed by: ORTHOPAEDIC SURGERY

## 2022-10-12 RX ORDER — CEFAZOLIN SODIUM 2 G/50ML
2 SOLUTION INTRAVENOUS
Status: CANCELLED | OUTPATIENT
Start: 2022-10-12

## 2022-10-12 RX ORDER — PREGABALIN 75 MG/1
75 CAPSULE ORAL 2 TIMES DAILY
Qty: 60 CAPSULE | Refills: 2 | Status: SHIPPED | OUTPATIENT
Start: 2022-10-12 | End: 2023-02-08 | Stop reason: SDUPTHER

## 2022-10-12 NOTE — PROGRESS NOTES
CC:  Right cubital tunnel syndrome and right carpal tunnel syndrome        HPI:  Donal Black is a very pleasant 27 y.o. male with ongoing symptoms right hand and arm for the past several years   No history of trauma   He does work out in the gym however and has noticed a popping sensation in his right elbow for the past year so these symptoms do cause the numbness which radiates down into his right ring and small finger   He has been evaluated elsewhere for both carpal tunnel and cubital tunnel syndrome   He has tried splinting without long-term relief  He is considering surgery for the right arm            PAST MEDICAL HISTORY: History reviewed. No pertinent past medical history.  PAST SURGICAL HISTORY: History reviewed. No pertinent surgical history.  FAMILY HISTORY:   Family History   Problem Relation Age of Onset    Hypertension Father     Thyroid disease Mother      SOCIAL HISTORY:   Social History     Socioeconomic History    Marital status: Single   Tobacco Use    Smoking status: Some Days    Smokeless tobacco: Never   Substance and Sexual Activity    Alcohol use: No    Drug use: No       MEDICATIONS:   Current Outpatient Medications:     diclofenac (VOLTAREN) 75 MG EC tablet, Take 1 tablet (75 mg total) by mouth 2 (two) times daily., Disp: 14 tablet, Rfl: 0    gabapentin (NEURONTIN) 300 MG capsule, Take 1 capsule (300 mg total) by mouth every evening., Disp: 60 capsule, Rfl: 1  ALLERGIES:   Review of patient's allergies indicates:   Allergen Reactions    Gabapentin Hallucinations       Review of Systems:  Constitutional: no fever or chills  ENT: no nasal congestion or sore throat  Respiratory: no cough or shortness of breath  Cardiovascular: no chest pain or palpitations  Gastrointestinal: no nausea or vomiting, PUD, GERD, NSAID intolerance  Genitourinary: no hematuria or dysuria  Integument/Breast: no rash or pruritis  Hematologic/Lymphatic: no easy bruising or lymphadenopathy  Musculoskeletal: see  "HPI  Neurological: no seizures or tremors  Behavioral/Psych: no auditory or visual hallucinations      Physical Exam   Vitals:    10/12/22 1400   Weight: 84.8 kg (187 lb)   Height: 5' 11" (1.803 m)   PainSc:   5       Constitutional: Oriented to person, place, and time. Appears well-developed and well-nourished.   HENT:   Head: Normocephalic and atraumatic.   Nose: Nose normal.   Eyes: No scleral icterus.   Neck: Normal range of motion.   Cardiovascular: Normal rate and regular rhythm.    Pulses:       Radial pulses are 2+ on the right side, and 2+ on the left side.   Pulmonary/Chest: Effort normal and breath sounds normal.   Abdominal: Soft.   Neurological: Alert and oriented to person, place, and time.   Skin: Skin is warm.   Psychiatric: Normal mood and affect.     MUSCULOSKELETAL UPPER EXTREMITY:  Examination right arm right elbow has some mild tenderness over the cubital tunnel and a positive Tinel sign over the ulnar nerve   There does appear to be some slight snapping with flexion and extension of the elbow which may represent subluxation of the nerve or possibly the medial border of the triceps  Examination right hand demonstrates a positive Tinel sign and a positive Phalen's test range of motion wrist fingers full  strength slightly decreased no atrophy noted            Diagnostic Studies:  Previous nerve test reviewed showing right carpal tunnel syndrome from 2019        Assessment:  1. Right cubital tunnel syndrome with possible snapping of the nerve.      2. Right carpal tunnel syndrome    Plan:  Explained the nature of these 2 problems the patient   Because of the duration of symptoms I have recommended surgical treatment for both he is in agreement   Will set this up as combined procedure for right ulnar nerve transposition right elbow and right carpal tunnel release as an outpatient surgery      The risks and benefits of surgery were discussed with the patient today and they understand.  The " "consent was signed in the office for surgery.      Bill Camejo MD (Jay)  [facility]  Orthopedic Upper Extremity Surgery       "

## 2022-12-06 ENCOUNTER — TELEPHONE (OUTPATIENT)
Dept: ORTHOPEDICS | Facility: CLINIC | Age: 27
End: 2022-12-06
Payer: MEDICAID

## 2022-12-06 NOTE — TELEPHONE ENCOUNTER
----- Message from Bill Camejo Jr., MD sent at 12/6/2022  1:39 PM CST -----  No- one side only  ----- Message -----  From: Kristyn Pena MA  Sent: 12/6/2022   1:14 PM CST  To: Bill Camejo Jr., MD    Patient is having right ctr/ulnar procedure done tomorrow. He would like to know if he can his left done as well.

## 2022-12-06 NOTE — TELEPHONE ENCOUNTER
Spoke with patient. Informed him that per Dr Camejo, he will only be able to do one hand at a time. Patient stated that he was also  not sure on whether he wanted a nerve block or not . He stated he will decide tomorrow morning before his procedure.

## 2022-12-12 ENCOUNTER — NURSE TRIAGE (OUTPATIENT)
Dept: ADMINISTRATIVE | Facility: CLINIC | Age: 27
End: 2022-12-12
Payer: MEDICAID

## 2022-12-12 DIAGNOSIS — G56.20 CUBITAL TUNNEL SYNDROME, UNSPECIFIED LATERALITY: Primary | ICD-10-CM

## 2022-12-12 DIAGNOSIS — G56.20 CUBITAL TUNNEL SYNDROME, UNSPECIFIED LATERALITY: ICD-10-CM

## 2022-12-12 RX ORDER — PREGABALIN 75 MG/1
75 CAPSULE ORAL 2 TIMES DAILY
Qty: 60 CAPSULE | Refills: 2 | OUTPATIENT
Start: 2022-12-12 | End: 2023-06-12

## 2022-12-12 RX ORDER — OXYCODONE AND ACETAMINOPHEN 7.5; 325 MG/1; MG/1
1 TABLET ORAL EVERY 8 HOURS PRN
Qty: 40 TABLET | Refills: 0 | Status: SHIPPED | OUTPATIENT
Start: 2022-12-12 | End: 2023-03-15

## 2022-12-12 RX ORDER — OXYCODONE AND ACETAMINOPHEN 7.5; 325 MG/1; MG/1
1 TABLET ORAL EVERY 8 HOURS PRN
Qty: 40 TABLET | Refills: 0 | OUTPATIENT
Start: 2022-12-12

## 2022-12-12 NOTE — TELEPHONE ENCOUNTER
Pt reports pharmacy states they are waiting for authorization from MD office before pt can get his pain medication refilled, Pt advised to call office when open in the morning per protocol, Pt encouraged to call back with any worsening symptoms or questions and verbalized understanding.    Reason for Disposition   Caller requesting a CONTROLLED substance prescription refill (e.g., narcotics, ADHD medicines)    Protocols used: Medication Refill and Renewal Call-A-AH

## 2022-12-12 NOTE — TELEPHONE ENCOUNTER
Spoke with patient and informed patient that medication had been called in to his preferred pharmacy. Patient stated that he did receive a phone call from the the pharmacy and stated that a prior authorization was needed. I informed the patient that we did fill our the prior authorization. I also informed the patient that he may have to pay for his prescription, or his prescription may be delayed because he did receive a narcotic less than 7 days ago. All questions were answered and patient verbalized understanding.

## 2022-12-12 NOTE — TELEPHONE ENCOUNTER
Pt states post op  pain medication not working, out of medication, ibuprofen not working, ice not working. Pt states has been elevating. Per care advice, advised to discuss with pcp and callback by nurse within 1 hour. Advised pt to callback for any further questions or concerns.   Reason for Disposition   SEVERE post-op pain (e.g., excruciating, pain scale 8-10) that is not controlled with pain medications    Additional Information   Negative: Sounds like a life-threatening emergency to the triager   Negative: Bright red, wide-spread, sunburn-like rash   Negative: SEVERE headache and after spinal (epidural) anesthesia   Negative: Vomiting and persists > 4 hours   Negative: Vomiting and abdomen looks much more swollen than usual   Negative: Drinking very little and dehydration suspected (e.g., no urine > 12 hours, very dry mouth, very lightheaded)   Negative: Patient sounds very sick or weak to the triager   Negative: Sounds like a serious complication to the triager   Negative: Fever > 100.4 F (38.0 C)   Negative: Caller has URGENT question and triager unable to answer question    Protocols used: Post-Op Symptoms and Smvxrksin-H-OL

## 2022-12-13 NOTE — TELEPHONE ENCOUNTER
----- Message from Reyna Augustine sent at 12/13/2022  8:41 AM CST -----  Type:  Patient Returning Call    Who Called:Pt   Would the patient rather a call back or a response via Cass Artner? Call back   Best Call Back Number:[phone]  Additional Information: pt received a missed call  from [facility] and called back to see if it was Dr Camejo office calling in ref to medication the insurance would not cover .. pt do not have VM

## 2022-12-13 NOTE — TELEPHONE ENCOUNTER
Spoke with patient and informed patient that PA was closed on our end and he should be hearing from the pharmacy shortly. All questions answered and patient verbalized understanding.

## 2022-12-13 NOTE — TELEPHONE ENCOUNTER
Attempted to contact patient, no answer, but I was able to leave a voicemail. I also spoke with the pharmacy who stated that they we're waiting on Prior Authorization. Pharmacy also stated that a PA had to be completed because a pain medication of more than 45 tablets were requested in a 30 day period. I did inform the pharmacy that PA was completed on our end, and that the patient was informed that this may come up. All questions answered and verbalized understanding.

## 2022-12-16 ENCOUNTER — TELEPHONE (OUTPATIENT)
Dept: ORTHOPEDICS | Facility: CLINIC | Age: 27
End: 2022-12-16
Payer: MEDICAID

## 2022-12-16 DIAGNOSIS — R11.0 POSTOPERATIVE NAUSEA: Primary | ICD-10-CM

## 2022-12-16 DIAGNOSIS — Z98.890 POSTOPERATIVE NAUSEA: Primary | ICD-10-CM

## 2022-12-16 RX ORDER — ONDANSETRON 4 MG/1
4 TABLET, ORALLY DISINTEGRATING ORAL EVERY 12 HOURS PRN
Qty: 14 TABLET | Refills: 0 | Status: SHIPPED | OUTPATIENT
Start: 2022-12-16 | End: 2022-12-29 | Stop reason: SDUPTHER

## 2022-12-16 NOTE — TELEPHONE ENCOUNTER
Zofran sent to pharmacy. This may make his sleepy. Make sure he is eating with pain medications also.

## 2022-12-16 NOTE — TELEPHONE ENCOUNTER
Spoke with patient. Informed that Zofran was sent to pharmacy and per Tory Corley, medication may make him sleepy and to be sure to eat while taking pain meds. Patient verbalized understanding.

## 2022-12-16 NOTE — PROGRESS NOTES
"Subjective:      Patient ID: Donal Black is a 27 y.o. male.    Chief Complaint: Post-op Evaluation of the Right Arm (Patient presents today for his post-op visit for his right arm. )      WILMAR  (French)    He is here for a postop visit. He is s/p Right elbow ulnar nerve transposition subcutaneous and Right carpal tunnel release by Dr. Camejo on 12/7/22.     He has mild pain in right arm. No numbness or tingling! He is taking prn oxycodone.     Dr. Camejo has him out of work for 3 months postop per patient.     History reviewed. No pertinent past medical history.      Current Outpatient Medications:     ondansetron (ZOFRAN-ODT) 4 MG TbDL, Take 1 tablet (4 mg total) by mouth every 12 (twelve) hours as needed (nausea)., Disp: 14 tablet, Rfl: 0    oxyCODONE-acetaminophen (PERCOCET) 7.5-325 mg per tablet, Take 1 tablet by mouth every 8 (eight) hours as needed for Pain., Disp: 40 tablet, Rfl: 0    pregabalin (LYRICA) 75 MG capsule, Take 1 capsule (75 mg total) by mouth 2 (two) times daily., Disp: 60 capsule, Rfl: 2    HYDROcodone-acetaminophen (NORCO) 5-325 mg per tablet, Take 1 tablet by mouth every 4 (four) hours as needed for Pain. (Patient not taking: Reported on 12/19/2022), Disp: 30 tablet, Rfl: 0    Review of patient's allergies indicates:   Allergen Reactions    Gabapentin Hallucinations       Review of Systems   Constitutional: Negative for chills, fever, night sweats and weight gain.   Gastrointestinal:  Negative for bowel incontinence, nausea and vomiting.   Genitourinary:  Negative for bladder incontinence.   Neurological:  Negative for disturbances in coordination and loss of balance.         Objective:        Ht 5' 11" (1.803 m)   Wt 80.7 kg (178 lb)   BMI 24.83 kg/m²     Ortho/SPM Exam    Exam of right UE:   Right carpal tunnel incision is clean and dry with sutures intact.   No signs of infection.     Right elbow incision is clean and dry.  No signs of infection.     Limited ROM of right " hand/wrist. He cannot make a full fist and almost has full extension of fingers.     Limited ROM right elbow- he lacks full extension.     He is NVI distally with good capillary refill. Compartments are soft.         Assessment:       Encounter Diagnoses   Name Primary?    Cubital tunnel syndrome, unspecified laterality Yes    Carpal tunnel syndrome of right wrist     S/P carpal tunnel release           Plan:       Donal was seen today for post-op evaluation.    Diagnoses and all orders for this visit:    Cubital tunnel syndrome, unspecified laterality  -     Ambulatory referral/consult to Physical/Occupational Therapy; Future    Carpal tunnel syndrome of right wrist  -     Ambulatory referral/consult to Physical/Occupational Therapy; Future    S/P carpal tunnel release  -     Ambulatory referral/consult to Physical/Occupational Therapy; Future      He is doing well s/p above surgery. Following plan made:     - Sutures removed without complication. Reviewed wound care.   - Work on ROM of elbow/hand/wrist.   - OT for right elbow and hand. Orders to [facility].   - No lifting with right arm for now.   - He is OOW for 3 months postop.   - Follow up with Dr. Camejo in 4 weeks and prn.     Follow up in about 4 weeks (around 1/16/2023) for postop visit with Dr. Camejo.

## 2022-12-16 NOTE — TELEPHONE ENCOUNTER
----- Message from Pati Massey MA sent at 12/16/2022  2:38 PM CST -----  Nausea medication is being requested  ----- Message -----  From: Kristyn Pena MA  Sent: 12/16/2022   1:26 PM CST  To: Jory Pryor    Good Afternoon,    Patient is requesting nausea medication. The pain medication is making him nauseous.    ----- Message -----  From: Lakia Guy  Sent: 12/16/2022   1:17 PM CST  To: Bashir STUART Staff    Patient states the medicine Dr. Camejo prescribed is working for pain, but makes him nauseous. He asked if he can get something for nausea. Please contact him to assist. Thanks

## 2022-12-19 ENCOUNTER — OFFICE VISIT (OUTPATIENT)
Dept: ORTHOPEDICS | Facility: CLINIC | Age: 27
End: 2022-12-19
Payer: MEDICAID

## 2022-12-19 VITALS — BODY MASS INDEX: 24.92 KG/M2 | WEIGHT: 178 LBS | HEIGHT: 71 IN

## 2022-12-19 DIAGNOSIS — G56.20 CUBITAL TUNNEL SYNDROME, UNSPECIFIED LATERALITY: Primary | ICD-10-CM

## 2022-12-19 DIAGNOSIS — Z98.890 S/P CARPAL TUNNEL RELEASE: ICD-10-CM

## 2022-12-19 DIAGNOSIS — G56.01 CARPAL TUNNEL SYNDROME OF RIGHT WRIST: ICD-10-CM

## 2022-12-19 PROCEDURE — 99024 PR POST-OP FOLLOW-UP VISIT: ICD-10-PCS | Mod: ,,, | Performed by: PHYSICIAN ASSISTANT

## 2022-12-19 PROCEDURE — 3044F HG A1C LEVEL LT 7.0%: CPT | Mod: CPTII,,, | Performed by: PHYSICIAN ASSISTANT

## 2022-12-19 PROCEDURE — 99024 POSTOP FOLLOW-UP VISIT: CPT | Mod: ,,, | Performed by: PHYSICIAN ASSISTANT

## 2022-12-19 PROCEDURE — 99999 PR PBB SHADOW E&M-EST. PATIENT-LVL III: CPT | Mod: PBBFAC,,, | Performed by: PHYSICIAN ASSISTANT

## 2022-12-19 PROCEDURE — 99213 OFFICE O/P EST LOW 20 MIN: CPT | Mod: PBBFAC,PN | Performed by: PHYSICIAN ASSISTANT

## 2022-12-19 PROCEDURE — 1160F RVW MEDS BY RX/DR IN RCRD: CPT | Mod: CPTII,,, | Performed by: PHYSICIAN ASSISTANT

## 2022-12-19 PROCEDURE — 99999 PR PBB SHADOW E&M-EST. PATIENT-LVL III: ICD-10-PCS | Mod: PBBFAC,,, | Performed by: PHYSICIAN ASSISTANT

## 2022-12-19 PROCEDURE — 1159F MED LIST DOCD IN RCRD: CPT | Mod: CPTII,,, | Performed by: PHYSICIAN ASSISTANT

## 2022-12-19 PROCEDURE — 1160F PR REVIEW ALL MEDS BY PRESCRIBER/CLIN PHARMACIST DOCUMENTED: ICD-10-PCS | Mod: CPTII,,, | Performed by: PHYSICIAN ASSISTANT

## 2022-12-19 PROCEDURE — 3044F PR MOST RECENT HEMOGLOBIN A1C LEVEL <7.0%: ICD-10-PCS | Mod: CPTII,,, | Performed by: PHYSICIAN ASSISTANT

## 2022-12-19 PROCEDURE — 1159F PR MEDICATION LIST DOCUMENTED IN MEDICAL RECORD: ICD-10-PCS | Mod: CPTII,,, | Performed by: PHYSICIAN ASSISTANT

## 2022-12-19 PROCEDURE — 3008F PR BODY MASS INDEX (BMI) DOCUMENTED: ICD-10-PCS | Mod: CPTII,,, | Performed by: PHYSICIAN ASSISTANT

## 2022-12-19 PROCEDURE — 3008F BODY MASS INDEX DOCD: CPT | Mod: CPTII,,, | Performed by: PHYSICIAN ASSISTANT

## 2022-12-19 NOTE — PATIENT INSTRUCTIONS
It was nice to meet you today!    You can get incisions wet. Okay to shower/wash hands.     You can work on moving your hand/wrist/elbow. No lifting with right arm yet.     I sent occupational therapy orders to [facility]. They should call you to schedule. You can call them at [phone] if needed.     Continue on oxycodone for pain. Take the least amount possible. Remember, it can make you sleepy and or constipated.     Message me about scar cream and I can ask Dr. Camejo.     Will have you see Dr. Camejo back in 4 weeks. Call or message me if you need anything.     Tory   [phone]

## 2022-12-27 ENCOUNTER — NURSE TRIAGE (OUTPATIENT)
Dept: ADMINISTRATIVE | Facility: CLINIC | Age: 27
End: 2022-12-27
Payer: MEDICAID

## 2022-12-27 PROBLEM — M79.601 RIGHT ARM PAIN: Status: ACTIVE | Noted: 2022-12-27

## 2022-12-27 NOTE — TELEPHONE ENCOUNTER
"Mr. Black had a carpal tunnel release with Dr. Camejo on 12/7, he has had his f/u visit and sutures have been removed.  Last night the wound began to dehisce, and today it has opened up all the way, gaping open approximately 1/2 "  He believes he can see a suture that may have been left in place, as well.  I advised he go to the ED now, keep the wound covered until he is seen in the ED.  He also explains that the oxycodone 7.5 makes him very angry, he doesn't like taking it, is asking to be changed back to hydrocodone, but maybe higher dose than the hydrocodone 5 mg.  I advised he ask in the ED, but that I would forward the message to Dr. Camejo/staff to address tomorrow.  He verbalized understanding.      Reason for Disposition   [1] Incision gaping open AND [2] < 48 hours since wound re-opened    Additional Information   Negative: [1] Major abdominal surgical incision AND [2] wound gaping open AND [3] visible internal organs   Negative: Sounds like a life-threatening emergency to the triager   Negative: [1] Bleeding from incision AND [2] won't stop after 10 minutes of direct pressure   Negative: [1] Bleeding (more than a few drops) from incision AND [2] blood vessel surgery (e.g., carotid endarterectomy, femoral bypass graft, kidney dialysis fistula, tracheostomy)   Negative: [1] Widespread rash AND [2] bright red, sunburn-like   Negative: Severe pain in the incision    Protocols used: Post-Op Incision Symptoms and Mpwaymkej-M-GB    "

## 2022-12-28 ENCOUNTER — PATIENT MESSAGE (OUTPATIENT)
Dept: ORTHOPEDICS | Facility: CLINIC | Age: 27
End: 2022-12-28
Payer: MEDICAID

## 2022-12-28 ENCOUNTER — TELEPHONE (OUTPATIENT)
Dept: ORTHOPEDICS | Facility: CLINIC | Age: 27
End: 2022-12-28
Payer: MEDICAID

## 2022-12-28 NOTE — TELEPHONE ENCOUNTER
Spoke to patient in regards to being seen Friday for wound check. Patient stated that he had his next door neighbor, who is an RN, take a look at his incision for him. He stated that she showed him that what looked like a suture was just dry blood, and also showed him that underneath the part of the wound that is opening, is healing skin, so he believes it is fine. I expressed to the patient that it would still be best for him to come in so we can take a look at it. He stated that he is coming in from [email or web address] and that he depends on someone for a ride and that right now he does not have money to give for gas money. I advised patient to please send us a picture through My chart to forward to physician so he can have a look at it. Patient verbalized understanding.

## 2022-12-28 NOTE — PROGRESS NOTES
Patient evaluated and plan of care established.  Please sign and return if in agreement.    Thank you,  AUDREY Isbell         Occupational Therapy [facility]  Initial Evaluation     Date: 12/29/2022  Name: Donal Black  Clinic Number: [id number]    Therapy Diagnosis: G56.20 (ICD-10-CM) - Cubital tunnel syndrome, unspecified laterality, G56.01 (ICD-10-CM) - Carpal tunnel syndrome of right wrist, Z98.890 (ICD-10-CM) - S/P carpal tunnel release  Encounter Diagnosis   Name Primary?    Right arm pain Yes     Physician: Tory Corley PA-C    Physician Orders: G56.20 (ICD-10-CM) - Cubital tunnel syndrome, unspecified laterality, G56.01 (ICD-10-CM) - Carpal tunnel syndrome of right wrist, Z98.890 (ICD-10-CM) - S/P carpal tunnel release; Evaluate and treat; OT for right elbow/wrist/hand. Eval and treat with all modalities. Good HEP.  Surgical Procedure and Date: s/p R elbow ulnar nerve transposition subcutaneous & R carpal tunnel release, 12/07/2022   post op: 3 weeks, 1 days  Dominant Side: Right  Date of Onset: 12/07/2022  History / Mechanism of Injury: Patient underwent s/p R elbow ulnar nerve transposition subcutaneous & R carpal tunnel release on 12/07/2022 by Dr. Camejo.    Previous Therapy:  none    Environmental Concerns/Fall Risk: None  Language: None  Cultural/Spiritual: None  Abuse/Neglect/Nutritional: None  Imaging / Tests: See Epic    Past Medical History/Physical Systems Review:    has no past medical history on file.     has a past surgical history that includes Carpal tunnel release (Right, 12/7/2022) and Ulnar nerve transposition (Right, 12/7/2022).    has a current medication list which includes the following prescription(s): hydrocodone-acetaminophen, ondansetron, oxycodone-acetaminophen, and pregabalin.    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 Reports, "no known Cancer, no pacemaker, no allergies to latex or adhesives."      Subjective     Patient Reports, "I have been having symptoms in my (Right) arm for about 7 years.  I went to the doctor and he decided to do surgery.  I was in a full arm cast for 2 weeks.  I went back to the doctor after the 2 weeks.  He took the cast off and took the sutures out of my wrist.  The sutures in my elbow are dissolvable.   The nurse said do not lift anything more than 1 pound.  He said do not put too much pressure when I squeeze items.   I am having difficulty opening containers, turning door knobs, lifting my fingers.  I feel like I have limited motion.  My symptoms have decreased since surgery.  I tried not to take the pain medicine and I did not realize how much the pain medicine helped."    Pain   At rest: 5/10  // 5/10  With work/ Activity:  5/10  // 5/10  Sleepin/10  // 5/10  Location of Pain: (Right) elbow // wrist    Patient's Goals for Therapy: increase motion strength, decrease    Objective     Sensation:  grossly intact  Scar / Wound: closed, cool to touch, pinkish in color  Edema:   centimeters (Right) / (Left)  Elbow Crease:  29.6  /  30.0  Proximal Wrist Crease: 17.8  / 16.9                       Active Range of Motion: hand  (Right) patient able to actively make composite (Bilateral) tight fists                                                            Active Range of Motion: elbow/wrist                         (Right)   //  (Left)  Elbow Extension/Flexion: 0 / 135  //  0/137  Dorsal Flexion /Volar Flexion: 50 / 55  //  " 75/80  Radial Deviation /Ulnar Deviation:  7 / 16  //  20/40  Supination / Pronation:  55 / 60  //   90/85    Comments:  patient reports stretching pain with Active range of motion for (Right) wrist Ulnar Deviation, and Dorsal Flexion.      Passive Range of Motion: elbow/wrist                         (Right)    Elbow Extension/Flexion:  WNL  Dorsal Flexion /Volar Flexion:  60 / 65  Radial Deviation /Ulnar Deviation:  20  / 25   Supination / Pronation:  70 / 70    Manual Muscle Test:  Elbow / Wrist          (Right)        Elbow Flexion:      To be assessed   Elbow Extension:  To be assessed   Dorsal Flexion:  To be assessed   Volar Flexion:  To be assessed   Radial Deviation:  To be assessed   Ulnar Deviation:   To be assessed          Strength: (KHALIF Dynamometer in pounds),Position II  (Right):  To be assessed   (Left):  To be assessed     Pinch Strength: (Pinch Gauge in pounds)             (Right) /  (Left)  Lateral Pinch:   To be assessed   3 Point Pinch:  To be assessed   2 Point Pinch:  To be assessed          FOTO SCORE: 59%      Treatment Included:   Occupational Therapy  evaluation and instruction in written Home Exercise Program including  Tendon glides for intrinsic +/-, flat fist and composite fist;  0# Dorsal Flexion, Volar Flexion, Radial Deviation, Ulnar Deviation, and Supination, Pronation,   0# bicep curls for elbow Extension and flexion x 10 repetitions x 4 x daily.    Patient. Educated on modalities for home pain management, activity modifications and precautions, Multidirectional scar massage for scar management.   Patient. Demo understanding of above.     Patient/Family Education: role of Occupational Therapy, goals for Occupational Therapy, scheduling/cancellations - patient verbalized understanding. Discussed insurance limitations with patient.        Assessment:     Problem List :       Decreased Range of motion,  Decreased functional abilities,  Increased pain,   Edema       During the  evaluation, the patient required Minimal modification or assistance.  The following co-morbid conditions/personal factors may affect the plan of care: none.        Donal Black is a 27 y.o. male referred to outpatient occupational therapy and presents with a medical diagnosis of G56.20 (ICD-10-CM) - Cubital tunnel syndrome, unspecified laterality, G56.01 (ICD-10-CM) - Carpal tunnel syndrome of right wrist, Z98.890 (ICD-10-CM) - S/P carpal tunnel release, resulting in limited range of motion, strength, functional abilities, increased pain and inflammation and demonstrates limitations as described in the chart above. Following medical record review it is determined that patient will benefit from occupational therapy services in order to maximize pain free and/or functional use of right upper extremity. The following goals were discussed with the patient and patient is in agreement with them as to be addressed in the treatment plan. The patient's rehab potential is Good.     Anticipated Barriers to Therapy:  Transportation  (relies on transportation)     The following goals were discussed with the patient and patient is in agreement with them as to be addressed in the treatment plan.     Goals:   Goals:  1)   Patient to be Independent with Home exercise program and modalities for pain management by 1 week.   2)   Patient will report   3/10 (Right) elbow and 3/10 (Right) wrist pain on average with activity to assist with exercises by 6-8 weeks.  3)   Patient will increase range of Motion by 3-5 degrees to increase functional use for activities of daily living by 6-8 weeks.  4)   Patient will decrease edema by 0.1-0.3 millimeters  in (Right) Proximal Wrist Crease to increase joint mobility /flexibility by 6-8 weeks.         Plan:   Patient to be treated by Occupational Therapy    2    times per week for     90 days   during the certification period from   12/29/2022  to 03/29/2023     to achieve the established  goals.  Treatment to include :  paraffin, fluidotherapy, manual therapy/joint mobilizations, kinesiotaping, dry needling performed by certified physical therapist,   modalities for pain management, Ultrasound 1.0 /3.0mhz, therapeutic exercises/activities,        strengthening,    as well as any other treatments deemed necessary based on the patient's needs or progress.     Will reassess as needed and adjust treatment plan accordingly.              I certify the need for these services furnished under this plan of treatment and while under my care    ____________________________________                         __________________  Physician/Referring Practitioner                                               Date of Signature    Brigida Trammell, OT

## 2022-12-28 NOTE — TELEPHONE ENCOUNTER
----- Message from Montse Estrada sent at 12/27/2022  5:11 PM CST -----  Type: General Call      Who called: pt      What is the request in detail:  pt called stating his incision is opening up and wants to see what to do next please reach out to pt      Would the patient rather a call back or a response via My [facility]? Call     Best call back number    [phone]

## 2022-12-29 ENCOUNTER — CLINICAL SUPPORT (OUTPATIENT)
Dept: REHABILITATION | Facility: HOSPITAL | Age: 27
End: 2022-12-29
Payer: MEDICAID

## 2022-12-29 ENCOUNTER — PATIENT MESSAGE (OUTPATIENT)
Dept: ORTHOPEDICS | Facility: CLINIC | Age: 27
End: 2022-12-29
Payer: MEDICAID

## 2022-12-29 DIAGNOSIS — G56.20 CUBITAL TUNNEL SYNDROME, UNSPECIFIED LATERALITY: ICD-10-CM

## 2022-12-29 DIAGNOSIS — M79.601 RIGHT ARM PAIN: Primary | ICD-10-CM

## 2022-12-29 DIAGNOSIS — G56.20 CUBITAL TUNNEL SYNDROME, UNSPECIFIED LATERALITY: Primary | ICD-10-CM

## 2022-12-29 DIAGNOSIS — G56.01 CARPAL TUNNEL SYNDROME OF RIGHT WRIST: ICD-10-CM

## 2022-12-29 DIAGNOSIS — Z98.890 S/P CARPAL TUNNEL RELEASE: ICD-10-CM

## 2022-12-29 PROCEDURE — 97165 OT EVAL LOW COMPLEX 30 MIN: CPT | Mod: PN

## 2022-12-29 PROCEDURE — 97530 THERAPEUTIC ACTIVITIES: CPT | Mod: PN

## 2022-12-29 RX ORDER — OXYCODONE AND ACETAMINOPHEN 7.5; 325 MG/1; MG/1
1 TABLET ORAL EVERY 8 HOURS PRN
Qty: 40 TABLET | Refills: 0 | OUTPATIENT
Start: 2022-12-29

## 2022-12-29 RX ORDER — HYDROCODONE BITARTRATE AND ACETAMINOPHEN 5; 325 MG/1; MG/1
1 TABLET ORAL
Qty: 20 TABLET | Refills: 0 | Status: SHIPPED | OUTPATIENT
Start: 2022-12-29 | End: 2023-03-15

## 2022-12-29 NOTE — PROGRESS NOTES
"                                  Occupational Therapy Daily Treatment Note       Date: 1/4/2023  Name: Donal Black  Clinic Number: [id number]    Therapy Diagnosis: G56.20 (ICD-10-CM) - Cubital tunnel syndrome, unspecified laterality, G56.01 (ICD-10-CM) - Carpal tunnel syndrome of right wrist, Z98.890 (ICD-10-CM) - S/P carpal tunnel release  Encounter Diagnosis   Name Primary?    Right arm pain Yes     Physician: Tory Corley PA-C;  Dr. Bill Camejo    Physician Orders: G56.20 (ICD-10-CM) - Cubital tunnel syndrome, unspecified laterality, G56.01 (ICD-10-CM) - Carpal tunnel syndrome of right wrist, Z98.890 (ICD-10-CM) - S/P carpal tunnel release; evaluate and treat  Medical Diagnosis: G56.20 (ICD-10-CM) - Cubital tunnel syndrome, unspecified laterality, G56.01 (ICD-10-CM) - Carpal tunnel syndrome of right wrist, Z98.890 (ICD-10-CM) - S/P carpal tunnel release  Surgical Procedure and Date: s/p R elbow ulnar nerve transposition subcutaneous & R carpal tunnel release, 12/07/2022    Insurance Authorization Period Expiration: 01/02/2023-12/31/2023    Plan of Care Certification Period: 12/29/2022-03/29/2023  Date of Return to MD: 01/11/2023    Evaluation FOTO:  12/29/2022 = 59%    Visit # / Visits authorized: 1 / 20   Time In:  11:45  Time Out: 12:30   Total Billable Time: 40  minutes    Precautions:  Standard      Post Op:   4 weeks,  0 days      Subjective     Patient reports: "I am having a lot more pain in my hand.  I think I have been over using my hand.  I have been painting the bathroom with my hand and I think that's why I am having more pain.  The scar feels better since the skin fell off.  I also feel more swollen in my wrist."     5/10 pain   (Right) elbow  // 5/10  (Right) wrist    Objective    Patient seen by Occupational Therapy this session. Tx consisted of:        Therapeutic Activities  to improve functional performance while increasing strength, endurance, range of motion,  and flexibility  x   40  " "minutes:    -Fluidotherapy to  (Right)   hand to increase blood flow, circulation, tissue elasticity, desensitization, sensory re-education and for pain management  x  8  minutes.       -Ultrasound applied to volar aspect of (Right) wrist  on 3.0 Mhz with 0.6 w/cm2 @ 100 % duty cycle to decrease pain and inflammation  /////  to remold scar tissue and increase tissue elasticity x 8 minutes.      -Scar Massage to volar aspect of (Right) wrist region  with  mini therapeutic vibrator  to decrease dense scar adhesions and improve tensile glide  x 2 minutes.  -Manual Therapy techniques to   (Right) wrist  including joint mobilizations, stretching, and Passive Range of Motion to increase joint mobility, range of motion  and for pain management  x  5 minutes   -Soft Tissue Mobilization to  (Right) hand into wrist and forearm  from distal to proximal to decrease edema and increase range of motion and functional abilities  x  1  minute       - 0# Bicep curls for elbow Extension and flexion ( Supination  /neutral /Pronation) x 10 repetitions each  - LARGE dowel board for elbow Extension /Flexion and Supination /Pronation   - 0# Dorsiflexion/Volar flexion (Supination /Pronation ), Radial Deviation / Ulnar Deviation x 10 repetitions -NP  - Wrist roller coaster for Active Range of Motion  of wrist in all planes x 5 repetitions    - Weighted ball on tabletop for Dorsiflexion /Volar Flexion  stretches x 20 repetitions   - Wrist wheel for Supination / Pronation stretch x 10 repetitions  - RED Theraputty for rolling x 20 repetitions   - RED Theraputty for composite  x 20 repetitions       -NP = Not Performed    Initiate in future therapy sessions:      -  ###Progressive Hand Gripper (black spring) for composite  x 20 repetitions   - ### Theraputty for pulling, PVC for "cookie cutting" and medicine top x 10  Repetitions   - ### digiflex for isolated x 10 repetitions;  ### digiflex for composite x 20 repetitions    - Wooden " "pegboard with ### clothespins with 3PT pinch x 3 repetitions   - YELLOW digiweb for composite digital Extension and  intrinsics x 20 repetitions      -  #### dowel for Supination /Pronation  x 20 repetitions   - Low load prolonged stretches for Dorsiflexion /Volar Flexion with ### leg weight (On edge of table) x 30 seconds each x 2 repetitions   - RED Exercise ball on table top for Radial Deviation /Ulnar Deviation stretches ( with hands in "W" pattern)   - ### Flexbar for Supination /Pronation  and Dorsiflexion /Volar Flexion x 20 repetitions    - ### Wrist exerstick in standing for Dorsiflexion / Volar Flexion  x 3 repetitions               Assessment     Patient will continue to benefit from skilled Occupational Therapy intervention to increase functional abilities, range of motion, and strength and pain control.  Patient. demonstrated proper understanding of each exercise.  Patient continues to require verbal and tactile cues for throughout therapy session to maintain position and prevent compensation.   Patient continues to be limited in functional and leisurely pursuits. Pain limits patient's participation in activities of daily living.  Patient is not able to carryout necessary vocational tasks.   Patient's spiritual, cultural and educational needs considered and patient agreeable to plan of care and goals.  Patient is making good progress towards established goals.  Patient tolerated exercises within pain tolerance.   Reviewed Home Exercise Program with patient., see EPIC under "patient instructions" for provided exercises, activity modifications and limitations, modalities for home pain management.  Patient. demo understanding of above.    Patient. tolerated Fluidotherapy  & Ultrasound  with no irritation.     Edema:   centimeters (Right) / (Left)  Elbow Crease:  29.6  /  30.0  Proximal Wrist Crease: 17.8 (-0.0) / 16.9    Patient demo edema in (Right) Proximal Wrist Crease remains the same.     New/Revised " Goals: Continue Plan Of Care   Goals:  1)   Patient to be Independent with Home exercise program and modalities for pain management by 1 week. (MET)  2)   Patient will report   3/10 (Right) elbow and 3/10 (Right) wrist pain on average with activity to assist with exercises by 6-8 weeks.  (In Progress)  3)   Patient will increase range of Motion by 3-5 degrees to increase functional use for activities of daily living by 6-8 weeks.  (In Progress)  4)   Patient will decrease edema by 0.1-0.3 millimeters  in (Right) Proximal Wrist Crease to increase joint mobility /flexibility by 6-8 weeks. (In Progress)    Plan      Continue 2x week during the 90 day certification period    12/29/2022  to 03/29/2023   with established Plan Of Care in pursuit of Occupational Therapy goals.      Brigida Trammell, OT

## 2022-12-29 NOTE — PLAN OF CARE
Patient evaluated and plan of care established.  Please sign and return if in agreement.    Thank you,  AUDREY Isebll         Occupational Therapy [facility]  Initial Evaluation     Date: 12/29/2022  Name: Donal Black  Clinic Number: [id number]    Therapy Diagnosis: G56.20 (ICD-10-CM) - Cubital tunnel syndrome, unspecified laterality, G56.01 (ICD-10-CM) - Carpal tunnel syndrome of right wrist, Z98.890 (ICD-10-CM) - S/P carpal tunnel release  Encounter Diagnosis   Name Primary?    Right arm pain Yes     Physician: Tory Corley PA-C    Physician Orders: G56.20 (ICD-10-CM) - Cubital tunnel syndrome, unspecified laterality, G56.01 (ICD-10-CM) - Carpal tunnel syndrome of right wrist, Z98.890 (ICD-10-CM) - S/P carpal tunnel release; Evaluate and treat; OT for right elbow/wrist/hand. Eval and treat with all modalities. Good HEP.  Surgical Procedure and Date: s/p R elbow ulnar nerve transposition subcutaneous & R carpal tunnel release, 12/07/2022   post op: 3 weeks, 1 days  Dominant Side: Right  Date of Onset: 12/07/2022  History / Mechanism of Injury: Patient underwent s/p R elbow ulnar nerve transposition subcutaneous & R carpal tunnel release on 12/07/2022 by Dr. Camejo.    Previous Therapy:  none    Environmental Concerns/Fall Risk: None  Language: None  Cultural/Spiritual: None  Abuse/Neglect/Nutritional: None  Imaging / Tests: See Epic    Past Medical History/Physical Systems Review:    has no past medical history on file.     has a past surgical history that includes Carpal tunnel release (Right, 12/7/2022) and Ulnar nerve transposition (Right, 12/7/2022).    has a current medication list which includes the following prescription(s): hydrocodone-acetaminophen, ondansetron, oxycodone-acetaminophen, and pregabalin.    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 Reports, "no known Cancer, no pacemaker, no allergies to latex or adhesives."      Subjective     Patient Reports, "I have been having symptoms in my (Right) arm for about 7 years.  I went to the doctor and he decided to do surgery.  I was in a full arm cast for 2 weeks.  I went back to the doctor after the 2 weeks.  He took the cast off and took the sutures out of my wrist.  The sutures in my elbow are dissolvable.   The nurse said do not lift anything more than 1 pound.  He said do not put too much pressure when I squeeze items.   I am having difficulty opening containers, turning door knobs, lifting my fingers.  I feel like I have limited motion.  My symptoms have decreased since surgery.  I tried not to take the pain medicine and I did not realize how much the pain medicine helped."    Pain   At rest: 5/10  // 5/10  With work/ Activity:  5/10  // 5/10  Sleepin/10  // 5/10  Location of Pain: (Right) elbow // wrist    Patient's Goals for Therapy: increase motion strength, decrease    Objective     Sensation:  grossly intact  Scar / Wound: closed, cool to touch, pinkish in color  Edema:   centimeters (Right) / (Left)  Elbow Crease:  29.6  /  30.0  Proximal Wrist Crease: 17.8  / 16.9                       Active Range of Motion: hand  (Right) patient able to actively make composite (Bilateral) tight fists                                                            Active Range of Motion: elbow/wrist                         (Right)   //  (Left)  Elbow Extension/Flexion: 0 / 135  //  0/137  Dorsal Flexion /Volar Flexion: 50 / 55  //  " 75/80  Radial Deviation /Ulnar Deviation:  7 / 16  //  20/40  Supination / Pronation:  55 / 60  //   90/85    Comments:  patient reports stretching pain with Active range of motion for (Right) wrist Ulnar Deviation, and Dorsal Flexion.      Passive Range of Motion: elbow/wrist                         (Right)    Elbow Extension/Flexion:  WNL  Dorsal Flexion /Volar Flexion:  60 / 65  Radial Deviation /Ulnar Deviation:  20  / 25   Supination / Pronation:  70 / 70    Manual Muscle Test:  Elbow / Wrist          (Right)        Elbow Flexion:      To be assessed   Elbow Extension:  To be assessed   Dorsal Flexion:  To be assessed   Volar Flexion:  To be assessed   Radial Deviation:  To be assessed   Ulnar Deviation:   To be assessed          Strength: (KHALIF Dynamometer in pounds),Position II  (Right):  To be assessed   (Left):  To be assessed     Pinch Strength: (Pinch Gauge in pounds)             (Right) /  (Left)  Lateral Pinch:   To be assessed   3 Point Pinch:  To be assessed   2 Point Pinch:  To be assessed          FOTO SCORE: 59%      Treatment Included:   Occupational Therapy  evaluation and instruction in written Home Exercise Program including  Tendon glides for intrinsic +/-, flat fist and composite fist;  0# Dorsal Flexion, Volar Flexion, Radial Deviation, Ulnar Deviation, and Supination, Pronation,   0# bicep curls for elbow Extension and flexion x 10 repetitions x 4 x daily.    Patient. Educated on modalities for home pain management, activity modifications and precautions, Multidirectional scar massage for scar management.   Patient. Demo understanding of above.     Patient/Family Education: role of Occupational Therapy, goals for Occupational Therapy, scheduling/cancellations - patient verbalized understanding. Discussed insurance limitations with patient.        Assessment:     Problem List :       Decreased Range of motion,  Decreased functional abilities,  Increased pain,   Edema       During the  evaluation, the patient required Minimal modification or assistance.  The following co-morbid conditions/personal factors may affect the plan of care: none.        Donal Black is a 27 y.o. male referred to outpatient occupational therapy and presents with a medical diagnosis of G56.20 (ICD-10-CM) - Cubital tunnel syndrome, unspecified laterality, G56.01 (ICD-10-CM) - Carpal tunnel syndrome of right wrist, Z98.890 (ICD-10-CM) - S/P carpal tunnel release, resulting in limited range of motion, strength, functional abilities, increased pain and inflammation and demonstrates limitations as described in the chart above. Following medical record review it is determined that patient will benefit from occupational therapy services in order to maximize pain free and/or functional use of right upper extremity. The following goals were discussed with the patient and patient is in agreement with them as to be addressed in the treatment plan. The patient's rehab potential is Good.     Anticipated Barriers to Therapy:  Transportation  (relies on transportation)     The following goals were discussed with the patient and patient is in agreement with them as to be addressed in the treatment plan.     Goals:   Goals:  1)   Patient to be Independent with Home exercise program and modalities for pain management by 1 week.   2)   Patient will report   3/10 (Right) elbow and 3/10 (Right) wrist pain on average with activity to assist with exercises by 6-8 weeks.  3)   Patient will increase range of Motion by 3-5 degrees to increase functional use for activities of daily living by 6-8 weeks.  4)   Patient will decrease edema by 0.1-0.3 millimeters  in (Right) Proximal Wrist Crease to increase joint mobility /flexibility by 6-8 weeks.         Plan:   Patient to be treated by Occupational Therapy    2    times per week for     90 days   during the certification period from   12/29/2022  to 03/29/2023     to achieve the established  goals.  Treatment to include :  paraffin, fluidotherapy, manual therapy/joint mobilizations, kinesiotaping, dry needling performed by certified physical therapist,   modalities for pain management, Ultrasound 1.0 /3.0mhz, therapeutic exercises/activities,        strengthening,    as well as any other treatments deemed necessary based on the patient's needs or progress.     Will reassess as needed and adjust treatment plan accordingly.              I certify the need for these services furnished under this plan of treatment and while under my care    ____________________________________                         __________________  Physician/Referring Practitioner                                               Date of Signature    Brigida Trammell, OT

## 2022-12-30 NOTE — TELEPHONE ENCOUNTER
Patient was informed that he did not need to come in and that the provider took a look at his pictures and stated that all of images looks good. Patient stated that he didn't have a vehicle as well. Patient understood and confirmed cancellation of his appointment.

## 2023-01-04 ENCOUNTER — CLINICAL SUPPORT (OUTPATIENT)
Dept: REHABILITATION | Facility: HOSPITAL | Age: 28
End: 2023-01-04
Payer: MEDICAID

## 2023-01-04 DIAGNOSIS — M79.601 RIGHT ARM PAIN: Primary | ICD-10-CM

## 2023-01-04 PROCEDURE — 97530 THERAPEUTIC ACTIVITIES: CPT | Mod: PN

## 2023-01-04 NOTE — PROGRESS NOTES
"                                  Occupational Therapy Daily Treatment Note       Date: 1/6/2023  Name: Donal Black  Clinic Number: [id number]    Therapy Diagnosis: G56.20 (ICD-10-CM) - Cubital tunnel syndrome, unspecified laterality, G56.01 (ICD-10-CM) - Carpal tunnel syndrome of right wrist, Z98.890 (ICD-10-CM) - S/P carpal tunnel release  Encounter Diagnosis   Name Primary?    Right arm pain Yes     Physician: Tory Corley PA-C;  Dr. Bill Camejo    Physician Orders: G56.20 (ICD-10-CM) - Cubital tunnel syndrome, unspecified laterality, G56.01 (ICD-10-CM) - Carpal tunnel syndrome of right wrist, Z98.890 (ICD-10-CM) - S/P carpal tunnel release; evaluate and treat  Medical Diagnosis: G56.20 (ICD-10-CM) - Cubital tunnel syndrome, unspecified laterality, G56.01 (ICD-10-CM) - Carpal tunnel syndrome of right wrist, Z98.890 (ICD-10-CM) - S/P carpal tunnel release  Surgical Procedure and Date: s/p R elbow ulnar nerve transposition subcutaneous & R carpal tunnel release, 12/07/2022    Insurance Authorization Period Expiration: 01/02/2023-12/31/2023    Plan of Care Certification Period: 12/29/2022-03/29/2023  Date of Return to MD: 01/11/2023    Evaluation FOTO:  12/29/2022 = 59%    Visit # / Visits authorized: 2 / 20   Time In:  11:20    Time Out: 12:05   Total Billable Time: 45  minutes    Precautions:  Standard      Post Op:   4 weeks,  2 days      Subjective     Patient reports: "I ran out of pain medicine so I am going to have to find more.  The pain is about the same.  I feel a little tight today."     5/10 pain   (Right) elbow  // 5/10  (Right) wrist    Objective    Patient seen by Occupational Therapy this session. Tx consisted of:        Therapeutic Activities  to improve functional performance while increasing strength, endurance, range of motion,  and flexibility  x   45  minutes:    -Fluidotherapy to  (Right)   hand to increase blood flow, circulation, tissue elasticity, desensitization, sensory " "re-education and for pain management  x  8  minutes.       -Ultrasound applied to volar aspect of (Right) wrist  on 3.0 Mhz with 0.6 w/cm2 @ 100 % duty cycle to decrease pain and inflammation  /////  to remold scar tissue and increase tissue elasticity x 8 minutes.      -Scar Massage to volar aspect of (Right) wrist region  with  mini therapeutic vibrator  to decrease dense scar adhesions and improve tensile glide  x 2 minutes.  -Manual Therapy techniques to   (Right) wrist  including joint mobilizations, stretching, and Passive Range of Motion to increase joint mobility, range of motion  and for pain management  x  5 minutes   -Soft Tissue Mobilization to  (Right) hand into wrist and forearm  from distal to proximal to decrease edema and increase range of motion and functional abilities  x  1  minute       - 0# Bicep curls for elbow Extension and flexion ( Supination  /neutral /Pronation) x 10 repetitions each  - LARGE dowel board for elbow Extension /Flexion and Supination /Pronation   - 0# Dorsiflexion/Volar flexion (Supination /Pronation ), Radial Deviation / Ulnar Deviation x 10 repetitions -NP  - Wrist roller coaster for Active Range of Motion  of wrist in all planes x 5 repetitions    - Weighted ball on tabletop for Dorsiflexion /Volar Flexion  stretches x 20 repetitions   - Wrist wheel for Supination / Pronation stretch x 10 repetitions  - RED Theraputty for rolling x 20 repetitions   - RED Theraputty for composite  x 20 repetitions   - Wooden pegboard with GREEN clothespins with 3PT pinch x 2 repetitions      -NP = Not Performed    Initiate in future therapy sessions:      -  ###Progressive Hand Gripper (black spring) for composite  x 20 repetitions   - ### Theraputty for pulling, PVC for "cookie cutting" and medicine top x 10  Repetitions   - ### digiflex for isolated x 10 repetitions;  ### digiflex for composite x 20 repetitions    - YELLOW digiweb for composite digital Extension and  intrinsics x " "20 repetitions      -  #### dowel for Supination /Pronation  x 20 repetitions   - Low load prolonged stretches for Dorsiflexion /Volar Flexion with ### leg weight (On edge of table) x 30 seconds each x 2 repetitions   - RED Exercise ball on table top for Radial Deviation /Ulnar Deviation stretches ( with hands in "W" pattern)   - ### Flexbar for Supination /Pronation  and Dorsiflexion /Volar Flexion x 20 repetitions    - ### Wrist exerstick in standing for Dorsiflexion / Volar Flexion  x 3 repetitions               Assessment     Patient will continue to benefit from skilled Occupational Therapy intervention to increase functional abilities, range of motion, and strength and pain control.  Patient. demonstrated proper understanding of each exercise.  Patient continues to require verbal and tactile cues for throughout therapy session to maintain position and prevent compensation.   Patient continues to be limited in functional and leisurely pursuits. Pain limits patient's participation in activities of daily living.  Patient is not able to carryout necessary vocational tasks.   Patient's spiritual, cultural and educational needs considered and patient agreeable to plan of care and goals.  Patient is making good progress towards established goals.  Patient tolerated exercises within pain tolerance.   Reviewed Home Exercise Program with patient., see EPIC under "patient instructions" for provided exercises, activity modifications and limitations, modalities for home pain management.  Patient. demo understanding of above.    Patient. tolerated Fluidotherapy  & Ultrasound  with no irritation.     Patient tolerated addition of wooden pegboard with clothespins for 3 Point Pinch with no reported pain.      New/Revised Goals: Continue Plan Of Care   Goals:  1)   Patient to be Independent with Home exercise program and modalities for pain management by 1 week. (MET)  2)   Patient will report   3/10 (Right) elbow and 3/10 " (Right) wrist pain on average with activity to assist with exercises by 6-8 weeks.  (In Progress)  3)   Patient will increase range of Motion by 3-5 degrees to increase functional use for activities of daily living by 6-8 weeks.  (In Progress)  4)   Patient will decrease edema by 0.1-0.3 millimeters  in (Right) Proximal Wrist Crease to increase joint mobility /flexibility by 6-8 weeks. (In Progress)    Plan      Continue 2x week during the 90 day certification period    12/29/2022  to 03/29/2023   with established Plan Of Care in pursuit of Occupational Therapy goals.      Brigida Trammell, OT

## 2023-01-06 ENCOUNTER — CLINICAL SUPPORT (OUTPATIENT)
Dept: REHABILITATION | Facility: HOSPITAL | Age: 28
End: 2023-01-06
Payer: MEDICAID

## 2023-01-06 DIAGNOSIS — M79.601 RIGHT ARM PAIN: Primary | ICD-10-CM

## 2023-01-06 PROCEDURE — 97530 THERAPEUTIC ACTIVITIES: CPT | Mod: PN

## 2023-01-09 NOTE — PROGRESS NOTES
"                                  Occupational Therapy Daily Treatment Note       Date: 1/11/2023  Name: Donal Black  Clinic Number: [id number]    Therapy Diagnosis: G56.20 (ICD-10-CM) - Cubital tunnel syndrome, unspecified laterality, G56.01 (ICD-10-CM) - Carpal tunnel syndrome of right wrist, Z98.890 (ICD-10-CM) - S/P carpal tunnel release  Encounter Diagnosis   Name Primary?    Right arm pain Yes     Physician: Tory Corley PA-C;  Dr. Bill Camejo    Physician Orders: G56.20 (ICD-10-CM) - Cubital tunnel syndrome, unspecified laterality, G56.01 (ICD-10-CM) - Carpal tunnel syndrome of right wrist, Z98.890 (ICD-10-CM) - S/P carpal tunnel release; evaluate and treat  Medical Diagnosis: G56.20 (ICD-10-CM) - Cubital tunnel syndrome, unspecified laterality, G56.01 (ICD-10-CM) - Carpal tunnel syndrome of right wrist, Z98.890 (ICD-10-CM) - S/P carpal tunnel release  Surgical Procedure and Date: s/p R elbow ulnar nerve transposition subcutaneous & R carpal tunnel release, 12/07/2022    Insurance Authorization Period Expiration: 01/02/2023-12/31/2023    Plan of Care Certification Period: 12/29/2022-03/29/2023  Date of Return to MD: 01/11/2023    Evaluation FOTO:  12/29/2022 = 59%    Visit # / Visits authorized: 3 / 20   Time In:  9:30  Time Out: 10:15  Total Billable Time: 45 minutes    Precautions:  Standard      Post Op:   5 weeks,  0 days      Subjective     Patient reports: "I am having more pain today in my elbow.  My wrist is about the same.  I do not know if I did something when I was sleeping but I think I jerked my arm and hurt my elbow.  Ever since then I have had a tender bump on the inside of my elbow.  I was able to lift a gallon of milk the other day.  I could but I could tell I probably should not be lifting it.  I was not able to make Monday's therapy appointment because I was in too much pain. "    7/10 pain   (Right) elbow  // 5/10  (Right) wrist    Objective    Patient seen by Occupational Therapy " this session. Tx consisted of:        Therapeutic Activities  to improve functional performance while increasing strength, endurance, range of motion,  and flexibility  x   45  minutes:    -Fluidotherapy to  (Right)   hand to increase blood flow, circulation, tissue elasticity, desensitization, sensory re-education and for pain management  x  8  minutes.       -Ultrasound applied to volar aspect of (Right) wrist  on 3.0 Mhz with 0.6 w/cm2 @ 100 % duty cycle to decrease pain and inflammation  /////  to remold scar tissue and increase tissue elasticity x 8 minutes.      -Scar Massage to volar aspect of (Right) wrist region  with  mini therapeutic vibrator and scar extractor to decrease dense scar adhesions and improve tensile glide  x 2 minutes.  -Manual Therapy techniques to   (Right) wrist  including joint mobilizations, stretching, and Passive Range of Motion to increase joint mobility, range of motion  and for pain management  x  5 minutes   -Soft Tissue Mobilization to  (Right) hand into wrist and forearm  from distal to proximal to decrease edema and increase range of motion and functional abilities  x  1  minute       - 0# Bicep curls for elbow Extension and flexion ( Supination  /neutral /Pronation) x 10 repetitions each  - LARGE dowel board for elbow Extension /Flexion and Supination /Pronation   - 0# Dorsiflexion/Volar flexion (Supination /Pronation ), Radial Deviation / Ulnar Deviation x 10 repetitions -NP  - Wrist roller coaster for Active Range of Motion  of wrist in all planes x 5 repetitions    - Weighted ball on tabletop for Dorsiflexion /Volar Flexion  stretches x 20 repetitions -NP  - Wrist wheel for Supination / Pronation stretch x 10 repetitions  - RED Theraputty for rolling x 20 repetitions   - RED Theraputty for composite  x 20 repetitions   - Wooden pegboard with GREEN clothespins with 3PT pinch x 2 repetitions  - HAMMER for Supination /Pronation  x 10 repetitions      -NP = Not  "Performed    Initiate in future therapy sessions:      -  ###Progressive Hand Gripper (black spring) for composite  x 20 repetitions   - ### Theraputty for pulling, PVC for "cookie cutting" and medicine top x 10  Repetitions   - ### digiflex for isolated x 10 repetitions;  ### digiflex for composite x 20 repetitions    - YELLOW digiweb for composite digital Extension and  intrinsics x 20 repetitions      - Low load prolonged stretches for Dorsiflexion /Volar Flexion with ### leg weight (On edge of table) x 30 seconds each x 2 repetitions   - RED Exercise ball on table top for Radial Deviation /Ulnar Deviation stretches ( with hands in "W" pattern)   - ### Flexbar for Supination /Pronation  and Dorsiflexion /Volar Flexion x 20 repetitions    - ### Wrist exerstick in standing for Dorsiflexion / Volar Flexion  x 3 repetitions               Assessment     Patient will continue to benefit from skilled Occupational Therapy intervention to increase functional abilities, range of motion, and strength and pain control.  Patient. demonstrated proper understanding of each exercise.  Patient continues to require verbal and tactile cues for throughout therapy session to maintain position and prevent compensation.   Patient continues to be limited in functional and leisurely pursuits. Pain limits patient's participation in activities of daily living.  Patient is not able to carryout necessary vocational tasks.   Patient's spiritual, cultural and educational needs considered and patient agreeable to plan of care and goals.  Patient is making good progress towards established goals.  Patient tolerated exercises within pain tolerance.   Reviewed Home Exercise Program with patient., see EPIC under "patient instructions" for provided exercises, activity modifications and limitations, modalities for home pain management.  Patient. demo understanding of above.    Patient. tolerated Fluidotherapy  & Ultrasound  with no irritation. "     Patient demo a small approx. 1.5 cm lump just proximal to the medial epicondyle that is TTP.    Patient tolerated addition of HAMMER stretch for Supination / Pronation with tolerable stretching pain reported.       New/Revised Goals: Continue Plan Of Care   Goals:  1)   Patient to be Independent with Home exercise program and modalities for pain management by 1 week. (MET)  2)   Patient will report   3/10 (Right) elbow and 3/10 (Right) wrist pain on average with activity to assist with exercises by 6-8 weeks.  (In Progress)  3)   Patient will increase range of Motion by 3-5 degrees to increase functional use for activities of daily living by 6-8 weeks.  (In Progress)  4)   Patient will decrease edema by 0.1-0.3 millimeters  in (Right) Proximal Wrist Crease to increase joint mobility /flexibility by 6-8 weeks. (In Progress)    Plan      Continue 2x week during the 90 day certification period    12/29/2022  to 03/29/2023   with established Plan Of Care in pursuit of Occupational Therapy goals.      Brigida Trammell, OT

## 2023-01-11 ENCOUNTER — CLINICAL SUPPORT (OUTPATIENT)
Dept: REHABILITATION | Facility: HOSPITAL | Age: 28
End: 2023-01-11
Payer: MEDICAID

## 2023-01-11 ENCOUNTER — OFFICE VISIT (OUTPATIENT)
Dept: ORTHOPEDICS | Facility: CLINIC | Age: 28
End: 2023-01-11
Payer: MEDICAID

## 2023-01-11 VITALS — BODY MASS INDEX: 25.9 KG/M2 | HEIGHT: 71 IN | WEIGHT: 185 LBS

## 2023-01-11 DIAGNOSIS — G56.01 CARPAL TUNNEL SYNDROME OF RIGHT WRIST: ICD-10-CM

## 2023-01-11 DIAGNOSIS — G56.21 CUBITAL TUNNEL SYNDROME ON RIGHT: Primary | ICD-10-CM

## 2023-01-11 DIAGNOSIS — M79.601 RIGHT ARM PAIN: Primary | ICD-10-CM

## 2023-01-11 PROCEDURE — 3008F BODY MASS INDEX DOCD: CPT | Mod: CPTII,,, | Performed by: ORTHOPAEDIC SURGERY

## 2023-01-11 PROCEDURE — 1159F PR MEDICATION LIST DOCUMENTED IN MEDICAL RECORD: ICD-10-PCS | Mod: CPTII,,, | Performed by: ORTHOPAEDIC SURGERY

## 2023-01-11 PROCEDURE — 99024 POSTOP FOLLOW-UP VISIT: CPT | Mod: ,,, | Performed by: ORTHOPAEDIC SURGERY

## 2023-01-11 PROCEDURE — 97530 THERAPEUTIC ACTIVITIES: CPT | Mod: PN

## 2023-01-11 PROCEDURE — 99024 PR POST-OP FOLLOW-UP VISIT: ICD-10-PCS | Mod: ,,, | Performed by: ORTHOPAEDIC SURGERY

## 2023-01-11 PROCEDURE — 1159F MED LIST DOCD IN RCRD: CPT | Mod: CPTII,,, | Performed by: ORTHOPAEDIC SURGERY

## 2023-01-11 PROCEDURE — 3008F PR BODY MASS INDEX (BMI) DOCUMENTED: ICD-10-PCS | Mod: CPTII,,, | Performed by: ORTHOPAEDIC SURGERY

## 2023-01-11 PROCEDURE — 99213 OFFICE O/P EST LOW 20 MIN: CPT | Mod: PBBFAC,PN | Performed by: ORTHOPAEDIC SURGERY

## 2023-01-11 PROCEDURE — 99999 PR PBB SHADOW E&M-EST. PATIENT-LVL III: ICD-10-PCS | Mod: PBBFAC,,, | Performed by: ORTHOPAEDIC SURGERY

## 2023-01-11 PROCEDURE — 99999 PR PBB SHADOW E&M-EST. PATIENT-LVL III: CPT | Mod: PBBFAC,,, | Performed by: ORTHOPAEDIC SURGERY

## 2023-01-11 RX ORDER — IBUPROFEN 600 MG/1
600 TABLET ORAL 2 TIMES DAILY WITH MEALS
Qty: 60 TABLET | Refills: 1 | Status: SHIPPED | OUTPATIENT
Start: 2023-01-11 | End: 2023-01-11

## 2023-01-11 RX ORDER — DICLOFENAC SODIUM 10 MG/G
2 GEL TOPICAL 3 TIMES DAILY
Qty: 100 G | Refills: 1 | Status: SHIPPED | OUTPATIENT
Start: 2023-01-11 | End: 2023-03-15

## 2023-01-11 RX ORDER — NAPROXEN 500 MG/1
500 TABLET ORAL 2 TIMES DAILY WITH MEALS
Qty: 60 TABLET | Refills: 1 | Status: SHIPPED | OUTPATIENT
Start: 2023-01-11 | End: 2023-03-15

## 2023-01-11 NOTE — PROGRESS NOTES
Subjective:      Patient ID: Donal Black is a 27 y.o. male.  Chief Complaint: Post-op Evaluation (R Elbow )      HPI  Donal Black is a  27 y.o. male presenting today for post op visit.  He is s/p right carpal tunnel release with combined ulnar nerve decompression right elbow he is now about 5 weeks postop he is currently in therapy   Still having quite a bit of pain in the right arm which is worse at night   He does take Lyrica for this   He is currently in therapy   .     Review of patient's allergies indicates:   Allergen Reactions    Gabapentin Hallucinations         Current Outpatient Medications   Medication Sig Dispense Refill    pregabalin (LYRICA) 75 MG capsule Take 1 capsule (75 mg total) by mouth 2 (two) times daily. 60 capsule 2    diclofenac sodium (VOLTAREN) 1 % Gel Apply 2 g topically 3 (three) times daily. 100 g 1    HYDROcodone-acetaminophen (NORCO) 5-325 mg per tablet Take 1 tablet by mouth every 4 (four) hours as needed for Pain. (Patient not taking: Reported on 12/19/2022) 30 tablet 0    HYDROcodone-acetaminophen (NORCO) 5-325 mg per tablet Take 1 tablet by mouth every 24 hours as needed for Pain. (Patient not taking: Reported on 1/11/2023) 20 tablet 0    ibuprofen (ADVIL,MOTRIN) 600 MG tablet Take 1 tablet (600 mg total) by mouth 2 (two) times daily with meals. 60 tablet 1    ondansetron (ZOFRAN-ODT) 4 MG TbDL Take 1 tablet (4 mg total) by mouth every 12 (twelve) hours as needed (nausea). (Patient not taking: Reported on 1/11/2023) 14 tablet 0    oxyCODONE-acetaminophen (PERCOCET) 7.5-325 mg per tablet Take 1 tablet by mouth every 8 (eight) hours as needed for Pain. (Patient not taking: Reported on 1/11/2023) 40 tablet 0     No current facility-administered medications for this visit.       History reviewed. No pertinent past medical history.    Past Surgical History:   Procedure Laterality Date    CARPAL TUNNEL RELEASE Right 12/7/2022    Procedure: RELEASE, CARPAL TUNNEL;  Surgeon:  "Bill Camejo Jr., MD;  Location: [facility] OR;  Service: Orthopedics;  Laterality: Right;    ULNAR NERVE TRANSPOSITION Right 12/7/2022    Procedure: TRANSPOSITION, NERVE, ULNAR;  Surgeon: Bill Camejo Jr., MD;  Location: [facility] OR;  Service: Orthopedics;  Laterality: Right;       OBJECTIVE:   PHYSICAL EXAM:  Height: 5' 11" (180.3 cm) Weight: 83.9 kg (185 lb)  Vitals:    01/11/23 1407   Weight: 83.9 kg (185 lb)   Height: 5' 11" (1.803 m)   PainSc:   7     Ortho/SPM Exam  Examination right elbow and right wrist both incisions well healed minimal swelling minimal tenderness no evidence of infection range of motion elbow wrist fingers full  strength decreased sensation intact    RADIOGRAPHS:  None  Comments: I have personally reviewed the imaging and I agree with the above radiologist's report.    ASSESSMENT/PLAN:     IMPRESSION:  Status post right carpal tunnel release and ulnar nerve release right elbow    PLAN:  I would like him to continue OT for the right elbow wrist and hand  Continue to advance activities as tolerated   I have ordered some Voltaren gel for topical use on both the elbow and the wrist  Motrin as well as needed for pain    FOLLOW UP:  4-6 weeks    Disclaimer: This note has been generated using voice-recognition software. There may be typographical errors that have been missed during proof-reading.     "

## 2023-01-12 NOTE — PROGRESS NOTES
"                                  Occupational Therapy Daily Treatment Note       Date: 1/18/2023  Name: Donal Black  Clinic Number: [id number]    Therapy Diagnosis: G56.20 (ICD-10-CM) - Cubital tunnel syndrome, unspecified laterality, G56.01 (ICD-10-CM) - Carpal tunnel syndrome of right wrist, Z98.890 (ICD-10-CM) - S/P carpal tunnel release  Encounter Diagnosis   Name Primary?    Right arm pain Yes     Physician: Tory Corley PA-C;  Dr. Bill Camejo    Physician Orders: G56.20 (ICD-10-CM) - Cubital tunnel syndrome, unspecified laterality, G56.01 (ICD-10-CM) - Carpal tunnel syndrome of right wrist, Z98.890 (ICD-10-CM) - S/P carpal tunnel release; evaluate and treat  Medical Diagnosis: G56.20 (ICD-10-CM) - Cubital tunnel syndrome, unspecified laterality, G56.01 (ICD-10-CM) - Carpal tunnel syndrome of right wrist, Z98.890 (ICD-10-CM) - S/P carpal tunnel release  Surgical Procedure and Date: s/p R elbow ulnar nerve transposition subcutaneous & R carpal tunnel release, 12/07/2022    Insurance Authorization Period Expiration: 01/02/2023-12/31/2023    Plan of Care Certification Period: 12/29/2022-03/29/2023  Date of Return to MD: 03/15/2023    Evaluation FOTO:  12/29/2022 = 59%    Visit # / Visits authorized: 4 / 20   Time In:  11:20  Time Out: 11:45  Total Billable Time: 25 minutes    Precautions:  Standard      Post Op:   6 weeks,  0 day      Subjective     Patient reports: "I went to see the doctor and he did not listen when I told him about the bump on my elbow.  I also told him I was in pain and he gave me a cream and that has been helping.  He said I could take over the counter antiinflammatories and that he would not give me anymore pain medicine.  He told me I should be lifting and doing everyday activities now and lifting weight."     7/10 pain   (Right) elbow  //  3/10  (Right) wrist    Objective    Patient seen by Occupational Therapy this session. Tx consisted of:        Therapeutic Activities  to " improve functional performance while increasing strength, endurance, range of motion,  and flexibility  x   25  minutes:    -Fluidotherapy to  (Right)   hand to increase blood flow, circulation, tissue elasticity, desensitization, sensory re-education and for pain management  x  0  minutes.   -NP    -Ultrasound applied to volar aspect of (Right) wrist  on 3.0 Mhz with 0.6 w/cm2 @ 100 % duty cycle to decrease pain and inflammation  /////  to remold scar tissue and increase tissue elasticity x 8 minutes.      -Scar Massage to volar aspect of (Right) wrist region  with  mini therapeutic vibrator and scar extractor to decrease dense scar adhesions and improve tensile glide  x 2 minutes.  -Manual Therapy techniques to   (Right) wrist  including joint mobilizations, stretching, and Passive Range of Motion to increase joint mobility, range of motion  and for pain management  x  4 minutes          - 2# Bicep curls for elbow Extension and flexion ( Supination  /neutral /Pronation) x 10 repetitions each  - LARGE dowel board for elbow Extension /Flexion and Supination /Pronation   - 0# Dorsiflexion/Volar flexion (Supination /Pronation ), Radial Deviation / Ulnar Deviation x 10 repetitions -NP  - Wrist roller coaster for Active Range of Motion  of wrist in all planes x 5 repetitions  -NP   - Weighted ball on tabletop for Dorsiflexion /Volar Flexion  stretches x 20 repetitions -NP  - Wrist wheel for Supination / Pronation stretch x 10 repetitions -NP  - RED Theraputty for rolling x 20 repetitions   - RED Theraputty for composite  x 20 repetitions   - Wooden pegboard with GREEN clothespins with 3PT pinch x 2 repetitions  - HAMMER for Supination /Pronation  x 10 repetitions    Patient provided with and educated on silicone scar pad for night wear for scar management.  Patient demo understanding of above.         -NP = Not Performed    Initiate in future therapy sessions:      -  ###Progressive Hand Gripper (black spring) for  "composite  x 20 repetitions   - ### Theraputty for pulling, PVC for "cookie cutting" and medicine top x 10  Repetitions   - ### digiflex for isolated x 10 repetitions;  ### digiflex for composite x 20 repetitions    - YELLOW digiweb for composite digital Extension and  intrinsics x 20 repetitions      - Low load prolonged stretches for Dorsiflexion /Volar Flexion with ### leg weight (On edge of table) x 30 seconds each x 2 repetitions   - RED Exercise ball on table top for Radial Deviation /Ulnar Deviation stretches ( with hands in "W" pattern)   - ### Flexbar for Supination /Pronation  and Dorsiflexion /Volar Flexion x 20 repetitions    - ### Wrist exerstick in standing for Dorsiflexion / Volar Flexion  x 3 repetitions               Assessment     Patient will continue to benefit from skilled Occupational Therapy intervention to increase functional abilities, range of motion, and strength and pain control.  Patient. demonstrated proper understanding of each exercise.  Patient continues to require verbal and tactile cues for throughout therapy session to maintain position and prevent compensation.   Patient continues to be limited in functional and leisurely pursuits. Pain limits patient's participation in activities of daily living.  Patient is not able to carryout necessary vocational tasks.   Patient's spiritual, cultural and educational needs considered and patient agreeable to plan of care and goals.  Patient is making good progress towards established goals.  Patient tolerated exercises within pain tolerance.   Reviewed Home Exercise Program with patient., see EPIC under "patient instructions" for provided exercises, activity modifications and limitations, modalities for home pain management.  Patient. demo understanding of above.    Patient. tolerated Ultrasound  with no irritation.     Patient tolerated increase in resistance during bicep curls for elbow Extension and flexion with no reported pain.    "   New/Revised Goals: Continue Plan Of Care   Goals:  1)   Patient to be Independent with Home exercise program and modalities for pain management by 1 week. (MET)  2)   Patient will report   3/10 (Right) elbow and 3/10 (Right) wrist pain on average with activity to assist with exercises by 6-8 weeks.  (In Progress)  3)   Patient will increase range of Motion by 3-5 degrees to increase functional use for activities of daily living by 6-8 weeks.  (In Progress)  4)   Patient will decrease edema by 0.1-0.3 millimeters  in (Right) Proximal Wrist Crease to increase joint mobility /flexibility by 6-8 weeks. (In Progress)    Plan      Continue 2x week during the 90 day certification period    12/29/2022  to 03/29/2023   with established Plan Of Care in pursuit of Occupational Therapy goals.      Brigida Trammell, OT

## 2023-01-18 ENCOUNTER — CLINICAL SUPPORT (OUTPATIENT)
Dept: REHABILITATION | Facility: HOSPITAL | Age: 28
End: 2023-01-18
Payer: MEDICAID

## 2023-01-18 DIAGNOSIS — M79.601 RIGHT ARM PAIN: Primary | ICD-10-CM

## 2023-01-18 DIAGNOSIS — G56.21 CUBITAL TUNNEL SYNDROME ON RIGHT: ICD-10-CM

## 2023-01-18 DIAGNOSIS — G56.01 CARPAL TUNNEL SYNDROME OF RIGHT WRIST: ICD-10-CM

## 2023-01-18 PROCEDURE — 97530 THERAPEUTIC ACTIVITIES: CPT | Mod: PN

## 2023-01-18 NOTE — PROGRESS NOTES
"                                  Occupational Therapy Daily Treatment Note       Date: 1/20/2023  Name: Donal Black  Clinic Number: [id number]    Therapy Diagnosis: G56.20 (ICD-10-CM) - Cubital tunnel syndrome, unspecified laterality, G56.01 (ICD-10-CM) - Carpal tunnel syndrome of right wrist, Z98.890 (ICD-10-CM) - S/P carpal tunnel release  Encounter Diagnosis   Name Primary?    Right arm pain Yes     Physician: Tory Corley PA-C;  Dr. Bill Camejo    Physician Orders: G56.20 (ICD-10-CM) - Cubital tunnel syndrome, unspecified laterality, G56.01 (ICD-10-CM) - Carpal tunnel syndrome of right wrist, Z98.890 (ICD-10-CM) - S/P carpal tunnel release; evaluate and treat  Medical Diagnosis: G56.20 (ICD-10-CM) - Cubital tunnel syndrome, unspecified laterality, G56.01 (ICD-10-CM) - Carpal tunnel syndrome of right wrist, Z98.890 (ICD-10-CM) - S/P carpal tunnel release  Surgical Procedure and Date: s/p R elbow ulnar nerve transposition subcutaneous & R carpal tunnel release, 12/07/2022    Insurance Authorization Period Expiration: 01/02/2023-12/31/2023    Plan of Care Certification Period: 12/29/2022-03/29/2023  Date of Return to MD: 01/11/2023    Evaluation FOTO:  12/29/2022 = 59%    Visit # / Visits authorized: 5 / 20   Time In:  11:00  Time Out: 11:45  Total Billable Time: 45 minutes    Precautions:  Standard      Post Op:   6 weeks,  2 days      Subjective     Patient reports: "I used the silicone scar pad the first night but forgot it last night.  I noticed the skin wasn't as dry.  I am feeling better in both my elbow and wrist today.  I still have that lump on the inner part of my elbow."    3/10 pain   (Right) elbow  // 3/10  (Right) wrist    Objective    Patient seen by Occupational Therapy this session. Tx consisted of:        Therapeutic Activities  to improve functional performance while increasing strength, endurance, range of motion,  and flexibility  x   45  minutes:    -Fluidotherapy to  (Right)   hand " "to increase blood flow, circulation, tissue elasticity, desensitization, sensory re-education and for pain management  x  8  minutes.       -Ultrasound applied to volar aspect of (Right) wrist  on 3.0 Mhz with 0.7 w/cm2 @ 100 % duty cycle to decrease pain and inflammation  /////  to remold scar tissue and increase tissue elasticity x 8 minutes.      -Scar Massage to volar aspect of (Right) wrist region  with  mini therapeutic vibrator and scar extractor to decrease dense scar adhesions and improve tensile glide  x 2 minutes.  -Manual Therapy techniques to   (Right) wrist  including joint mobilizations, stretching, and Passive Range of Motion to increase joint mobility, range of motion  and for pain management  x  4 minutes   -Soft Tissue Mass age to volar aspect of (Right) wrist region to increase blood flow for healing x 1 minute       - 2# Bicep curls for elbow Extension and flexion ( Supination  /neutral /Pronation) x 10 repetitions each -NP  - 0# Dorsiflexion/Volar flexion (Supination /Pronation ), Radial Deviation / Ulnar Deviation x 10 repetitions -NP  - Wrist roller coaster for Active Range of Motion  of wrist in all planes x 5 repetitions -NP   - Weighted ball on tabletop for Dorsiflexion /Volar Flexion  stretches x 20 repetitions -NP  - Wrist wheel for Supination / Pronation stretch x 10 repetitions  - RED Theraputty for rolling x 20 repetitions   - 25# Progressive Hand Gripper for composite  x 20 repetitions   - Wooden pegboard with GREEN clothespins with 3PT pinch x 2 repetitions  - 1# dowel for Supination /Pronation  x 10 repetitions    - BLUE Theraband in standing for Tricep press and bicep curls x 20 repetitions     -Updated Home Exercise Program: Patient provided with and educated on written Home Exercise Program with BLUE Theraband for tricep press and bicep curls.  See EMR under "patient instructions."  Patient demo understanding of above.          -NP = Not Performed    Initiate in future " "therapy sessions:       - ### Theraputty for pulling, PVC for "cookie cutting" and medicine top x 10  Repetitions   - ### digiflex for isolated x 10 repetitions;  ### digiflex for composite x 20 repetitions    - YELLOW digiweb for composite digital Extension and  intrinsics x 20 repetitions      - Low load prolonged stretches for Dorsiflexion /Volar Flexion with ### leg weight (On edge of table) x 30 seconds each x 2 repetitions   - RED Exercise ball on table top for Radial Deviation /Ulnar Deviation stretches ( with hands in "W" pattern)   - ### Flexbar for Supination /Pronation  and Dorsiflexion /Volar Flexion x 20 repetitions    - ### Wrist exerstick in standing for Dorsiflexion / Volar Flexion  x 3 repetitions               Assessment     Patient will continue to benefit from skilled Occupational Therapy intervention to increase functional abilities, range of motion, and strength and pain control.  Patient. demonstrated proper understanding of each exercise.  Patient continues to require verbal and tactile cues for throughout therapy session to maintain position and prevent compensation.   Patient continues to be limited in functional and leisurely pursuits. Pain limits patient's participation in activities of daily living.  Patient is not able to carryout necessary vocational tasks.   Patient's spiritual, cultural and educational needs considered and patient agreeable to plan of care and goals.  Patient is making good progress towards established goals.  Patient tolerated exercises within pain tolerance.   Reviewed Home Exercise Program with patient., see EPIC under "patient instructions" for provided exercises, activity modifications and limitations, modalities for home pain management.  Patient. demo understanding of above.    Patient. tolerated Fluidotherapy  & Ultrasound  with no irritation.     Patient tolerated addition of Progressive Hand Gripper for composite  and Theraband for tricep presses and " bicep curls with no reported pain.   Patient tolerated increase in resistance during dowel for Supination / Pronation stretch with tolerable stretching pain reported.      New/Revised Goals: Continue Plan Of Care   Goals:  1)   Patient to be Independent with Home exercise program and modalities for pain management by 1 week. (MET)  2)   Patient will report   3/10 (Right) elbow and 3/10 (Right) wrist pain on average with activity to assist with exercises by 6-8 weeks.  (In Progress)  3)   Patient will increase range of Motion by 3-5 degrees to increase functional use for activities of daily living by 6-8 weeks.  (In Progress)  4)   Patient will decrease edema by 0.1-0.3 millimeters  in (Right) Proximal Wrist Crease to increase joint mobility /flexibility by 6-8 weeks. (In Progress)    Plan      Continue 2x week during the 90 day certification period    12/29/2022  to 03/29/2023   with established Plan Of Care in pursuit of Occupational Therapy goals.      Brigida Trammell, OT

## 2023-01-20 ENCOUNTER — CLINICAL SUPPORT (OUTPATIENT)
Dept: REHABILITATION | Facility: HOSPITAL | Age: 28
End: 2023-01-20
Payer: MEDICAID

## 2023-01-20 DIAGNOSIS — M79.601 RIGHT ARM PAIN: Primary | ICD-10-CM

## 2023-01-20 PROCEDURE — 97530 THERAPEUTIC ACTIVITIES: CPT | Mod: PN

## 2023-01-23 NOTE — PROGRESS NOTES
"                                  Occupational Therapy Daily Treatment Note       Date: 1/25/2023  Name: Donal Black  Clinic Number: [id number]    Therapy Diagnosis: G56.20 (ICD-10-CM) - Cubital tunnel syndrome, unspecified laterality, G56.01 (ICD-10-CM) - Carpal tunnel syndrome of right wrist, Z98.890 (ICD-10-CM) - S/P carpal tunnel release  Encounter Diagnosis   Name Primary?    Right arm pain Yes     Physician: Tory Corley PA-C;  Dr. Bill Camejo    Physician Orders: G56.20 (ICD-10-CM) - Cubital tunnel syndrome, unspecified laterality, G56.01 (ICD-10-CM) - Carpal tunnel syndrome of right wrist, Z98.890 (ICD-10-CM) - S/P carpal tunnel release; evaluate and treat  Medical Diagnosis: G56.20 (ICD-10-CM) - Cubital tunnel syndrome, unspecified laterality, G56.01 (ICD-10-CM) - Carpal tunnel syndrome of right wrist, Z98.890 (ICD-10-CM) - S/P carpal tunnel release  Surgical Procedure and Date: s/p R elbow ulnar nerve transposition subcutaneous & R carpal tunnel release, 12/07/2022    Insurance Authorization Period Expiration: 01/02/2023-12/31/2023    Plan of Care Certification Period: 12/29/2022-03/29/2023  Date of Return to MD: 01/11/2023    Evaluation FOTO:  12/29/2022 = 59%    Visit # / Visits authorized: 6 / 20   Time In:  11:00  Time Out: 11:45  Total Billable Time: 45 minutes    Precautions:  Standard      Post Op:   7 weeks,  0 days      Subjective     Patient reports: "I still have that bump on my inner elbow which hurts the most 5/10 but the rest of the elbow feels good.  My wrist is doing better."    3-5/10 pain   (Right) elbow  //   3/10  (Right) wrist    Objective    Patient seen by Occupational Therapy this session. Tx consisted of:        Therapeutic Activities  to improve functional performance while increasing strength, endurance, range of motion,  and flexibility  x   45  minutes:    -Fluidotherapy to  (Right)   hand to increase blood flow, circulation, tissue elasticity, desensitization, sensory " "re-education and for pain management  x  8  minutes.       -Ultrasound applied to volar aspect of (Right) wrist  on 3.0 Mhz with 0.7 w/cm2 @ 100 % duty cycle to decrease pain and inflammation  /////  to remold scar tissue and increase tissue elasticity x 8 minutes.      -Scar Massage to volar aspect of (Right) wrist region  with  mini therapeutic vibrator and scar extractor to decrease dense scar adhesions and improve tensile glide  x 2 minutes.  -Manual Therapy techniques to   (Right) wrist  including joint mobilizations, stretching, and Passive Range of Motion to increase joint mobility, range of motion  and for pain management  x  4 minutes   -Soft Tissue Massage to volar aspect of (Right) wrist region to increase blood flow for healing x 1 minute       - 2# Bicep curls for elbow Extension and flexion ( Supination  /neutral /Pronation) x 10 repetitions each -NP  - 0# Dorsiflexion/Volar flexion (Supination /Pronation ), Radial Deviation / Ulnar Deviation x 10 repetitions -NP  - Wrist roller coaster for Active Range of Motion  of wrist in all planes x 5 repetitions -NP   - Weighted ball on tabletop for Dorsiflexion /Volar Flexion  stretches x 20 repetitions -NP  - Wrist wheel for Supination / Pronation with YELLOW Theraband x 10 repetitions   - RED Theraputty for rolling x 20 repetitions -NP  - 35# Progressive Hand Gripper for composite  x 20 repetitions    - Wooden pegboard with GREEN clothespins with 3PT pinch x 2 repetitions  - 1# dowel for Supination /Pronation  x 10 repetitions    - BLACK Theraband in standing for Tricep press and bicep curls x 20 repetitions         -Updated Home Exercise Program: Patient provided with and educated on written Home Exercise Program with  RED Theraputty for rolling, composite  and 3 Point Pinch.  Updated Theraband from BLUE--> BLACK for tricep press and bicep curls.   See EMR under "patient instructions."  Patient demo understanding of above.        -NP = Not " "Performed    Initiate in future therapy sessions:       - ### Theraputty for pulling, PVC for "cookie cutting" and medicine top x 10  Repetitions   - ### digiflex for isolated x 10 repetitions;  ### digiflex for composite x 20 repetitions    - YELLOW digiweb for composite digital Extension and  intrinsics x 20 repetitions       - ### Flexbar for Supination /Pronation  and Dorsiflexion /Volar Flexion x 20 repetitions    - ### Wrist exerstick in standing for Dorsiflexion / Volar Flexion  x 3 repetitions               Assessment     Patient will continue to benefit from skilled Occupational Therapy intervention to increase functional abilities, range of motion, and strength and pain control.  Patient. demonstrated proper understanding of each exercise.  Patient continues to require verbal and tactile cues for throughout therapy session to maintain position and prevent compensation.   Patient continues to be limited in functional and leisurely pursuits. Pain limits patient's participation in activities of daily living.  Patient is not able to carryout necessary vocational tasks.   Patient's spiritual, cultural and educational needs considered and patient agreeable to plan of care and goals.  Patient is making good progress towards established goals.  Patient tolerated exercises within pain tolerance.   Reviewed Home Exercise Program with patient., see EPIC under "patient instructions" for provided exercises, activity modifications and limitations, modalities for home pain management.  Patient. demo understanding of above.    Patient. tolerated Fluidotherapy  & Ultrasound  with no irritation.     Patient continues to demo tender lump about 2.5 cm from incision site and proximal of  (Right) medial epicondyle.     Patient tolerated increase in resistance for Theraband for tricep press and bicep curls, Progressive Hand Gripper for composite ; addition of Theraband for wrist wheel for Supination / Pronation  with no " reported pain.       New/Revised Goals: Continue Plan Of Care   Goals:  1)   Patient to be Independent with Home exercise program and modalities for pain management by 1 week. (MET)  2)   Patient will report   3/10 (Right) elbow and 3/10 (Right) wrist pain on average with activity to assist with exercises by 6-8 weeks.  (In Progress)  3)   Patient will increase range of Motion by 3-5 degrees to increase functional use for activities of daily living by 6-8 weeks.  (In Progress)  4)   Patient will decrease edema by 0.1-0.3 millimeters  in (Right) Proximal Wrist Crease to increase joint mobility /flexibility by 6-8 weeks. (In Progress)    Plan      Continue 2x week during the 90 day certification period    12/29/2022  to 03/29/2023   with established Plan Of Care in pursuit of Occupational Therapy goals.      Brigida Trammell, OT

## 2023-01-25 ENCOUNTER — CLINICAL SUPPORT (OUTPATIENT)
Dept: REHABILITATION | Facility: HOSPITAL | Age: 28
End: 2023-01-25
Payer: MEDICAID

## 2023-01-25 DIAGNOSIS — M79.601 RIGHT ARM PAIN: Primary | ICD-10-CM

## 2023-01-25 PROCEDURE — 97530 THERAPEUTIC ACTIVITIES: CPT | Mod: PN

## 2023-01-25 NOTE — PROGRESS NOTES
"  Patient reassessed and new plan of care established.  Please sign and return if in agreement.    Thank you,  AUDREY Isbell                                 Occupational Therapy Progress Note       Date: 2/1/2023  Name: Donal Black  Clinic Number: [id number]    Therapy Diagnosis: G56.20 (ICD-10-CM) - Cubital tunnel syndrome, unspecified laterality, G56.01 (ICD-10-CM) - Carpal tunnel syndrome of right wrist, Z98.890 (ICD-10-CM) - S/P carpal tunnel release  Encounter Diagnosis   Name Primary?    Right arm pain Yes     Physician: Tory Corley PA-C;  Dr. Bill Camejo    Physician Orders: G56.20 (ICD-10-CM) - Cubital tunnel syndrome, unspecified laterality, G56.01 (ICD-10-CM) - Carpal tunnel syndrome of right wrist, Z98.890 (ICD-10-CM) - S/P carpal tunnel release; evaluate and treat  Medical Diagnosis: G56.20 (ICD-10-CM) - Cubital tunnel syndrome, unspecified laterality, G56.01 (ICD-10-CM) - Carpal tunnel syndrome of right wrist, Z98.890 (ICD-10-CM) - S/P carpal tunnel release  Surgical Procedure and Date: s/p R elbow ulnar nerve transposition subcutaneous & R carpal tunnel release, 12/07/2022    Insurance Authorization Period Expiration: 01/02/2023-12/31/2023    Plan of Care Certification Period: 02/01/2022-05/01/2023  Date of Return to MD: 03/15/2023    Evaluation FOTO:  12/29/2022 = 59%  Reassessment FOTO: 02/01/2023 = 48%    Visit # / Visits authorized: 7 / 20   Time In:  11:10  Time Out: 11:55  Total Billable Time: 45 minutes    Precautions:  Standard      Post Op:   8 weeks,  0 days      Subjective     Patient reports: "I built 2 garden boxes over the weekend and I am pretty sore today in my elbow and hand.  My hand still feels stiff and the lump on my elbow is still very tender.  I used my hand a lot yesterday so my pain is back up to 5/10.  When I go to push up like out of a seat I have pain in my hand and elbow.  Also when I go to throw stuff with my (Right) hand."    5/10 pain   (Right) elbow  //   " 5/10  (Right) wrist    Objective    Patient seen by Occupational Therapy this session. Tx consisted of:        Therapeutic Activities  to improve functional performance while increasing strength, endurance, range of motion,  and flexibility  x   45  minutes:    -Fluidotherapy to  (Right)   hand to increase blood flow, circulation, tissue elasticity, desensitization, sensory re-education and for pain management  x  8  minutes.       -Ultrasound applied to volar aspect of (Right) wrist  on 3.0 Mhz with 0.7 w/cm2 @ 100 % duty cycle to decrease pain and inflammation  /////  to remold scar tissue and increase tissue elasticity x 8 minutes.      -Scar Massage to volar aspect of (Right) wrist region  with  mini therapeutic vibrator and scar extractor to decrease dense scar adhesions and improve tensile glide  x 2 minutes.  -Manual Therapy techniques to   (Right) wrist  including joint mobilizations, stretching, and Passive Range of Motion to increase joint mobility, range of motion  and for pain management  x  4 minutes   -Soft Tissue Massage to volar aspect of (Right) wrist region to increase blood flow for healing x 1 minute              Patient reassessed as follows:    Edema:   centimeters (Right) / (Left)  Elbow Crease:  29.5 (-0.1)   / 30.0  Proximal Wrist Crease: 17.4 (-0.4)  / 16.9                                                            Active Range of Motion: elbow/wrist                         (Right)   //  (Left)  Elbow Extension/Flexion: 0 / 135  //  0/137  Dorsal Flexion /Volar Flexion: 55 (+5) / 70 (+15)   //  75/80  Radial Deviation /Ulnar Deviation:  12 (+5)  / 25 (+9)   //  20/40  Supination / Pronation:  75 (+20) / 70 (+10)  //   90/85     Comments:  patient reports stretching pain with Active range of motion for (Right) wrist Ulnar Deviation, and Dorsal Flexion.         Passive Range of Motion: elbow/wrist                         (Right)    Elbow Extension/Flexion:  WNL  Dorsal Flexion /Volar Flexion:  " 65 (+5)  / 70 (+5)   Radial Deviation /Ulnar Deviation:  20  / 30 (+5)    Supination / Pronation:  80 (+10) / 80 (+10)      Manual Muscle Test:  Elbow / Wrist          (Right)        Elbow Flexion:   5/5     Elbow Extension:  5/5  Dorsal Flexion:  3/5  Volar Flexion:  3/5  Radial Deviation:  3+/5  Ulnar Deviation:  3+/5            Strength: (KHALIF Dynamometer in pounds),Position II  (Right):  30  (Left):    140     Pinch Strength: (Pinch Gauge in pounds)                      (Right) /  (Left)  Lateral Pinch:   24 / 30  3 Point Pinch:  14 / 30  2 Point Pinch:  15 / 20     Comments: pain reported with (Right) 3 Point Pinch strength.       Assessment     Patient will continue to benefit from skilled Occupational Therapy intervention to increase functional abilities, range of motion, and strength and pain control.  Patient. demonstrated proper understanding of each exercise.  Patient continues to require verbal and tactile cues for throughout therapy session to maintain position and prevent compensation.   Patient continues to be limited in functional and leisurely pursuits. Pain limits patient's participation in activities of daily living.  Patient is not able to carryout necessary vocational tasks.   Patient's spiritual, cultural and educational needs considered and patient agreeable to plan of care and goals.  Patient is making good progress towards established goals.  Patient tolerated exercises within pain tolerance.   Reviewed Home Exercise Program with patient., see EPIC under "patient instructions" for provided exercises, activity modifications and limitations, modalities for home pain management.  Patient. demo understanding of above.    Patient. tolerated Fluidotherapy  & Ultrasound  with no irritation.     Patient continues to demo tender lump about 2.5 cm from incision site and proximal of  (Right) medial epicondyle.      Patient demo decreased edema in (Right) elbow crease, and (Right) Proximal Wrist " Crease. Patient demo increased Active range of motion for  (Right) wrist Dorsal Flexion, Volar Flexion, Radial Deviation, Ulnar Deviation, Supination and Pronation    ; Passive range of motion for (Right) wrist Dorsal Flexion, Volar Flexion, Ulnar Deviation, Supination, and Pronation.       New/Revised Goals: Continue Plan Of Care   Goals:  1)   Patient to be Independent with Home exercise program and modalities for pain management by 1 week. (MET)  2)   Patient will report   3/10 (Right) elbow and 3/10 (Right) wrist pain on average with activity to assist with exercises by 6-8 weeks.  (In Progress)    3)   Patient will increase range of Motion by 3-5 degrees to increase functional use for activities of daily living by 6-8 weeks.  (In Progress)  4)   Patient will decrease edema by 0.1-0.3 millimeters  in (Right) Proximal Wrist Crease to increase joint mobility /flexibility by 6-8 weeks. (MET)   5)   Patient will increase manual muscle testing by a grade to assist with lifting items by 6-8 weeks.   ( Added 02/01/2023 )   6)   Patient will increase  strength 3-5 pounds to open containers by 6-8 weeks.  ( Added 02/01/2023 )   7)   Patient will increase pinch by 1-3 pounds for buttoning by 6-8 weeks.  ( Added 02/01/2023 )     Plan      Continue 2x week during the 90 day certification period    02/01/2023  to 05/01/2023   with established Plan Of Care in pursuit of Occupational Therapy goals.      Brigida Trammell, OT          I certify the need for these services furnished under this plan of treatment and while under my care    ____________________________________                         __________________  Physician/Referring Practitioner                                               Date of Signature

## 2023-02-01 ENCOUNTER — CLINICAL SUPPORT (OUTPATIENT)
Dept: REHABILITATION | Facility: HOSPITAL | Age: 28
End: 2023-02-01
Payer: MEDICAID

## 2023-02-01 DIAGNOSIS — M79.601 RIGHT ARM PAIN: Primary | ICD-10-CM

## 2023-02-01 PROCEDURE — 97530 THERAPEUTIC ACTIVITIES: CPT | Mod: PN

## 2023-02-01 NOTE — PROGRESS NOTES
"                                    Occupational Therapy Daily Treatment Note       Date: 2/6/2023  Name: Donal Black  Clinic Number: [id number]    Therapy Diagnosis: G56.20 (ICD-10-CM) - Cubital tunnel syndrome, unspecified laterality, G56.01 (ICD-10-CM) - Carpal tunnel syndrome of right wrist, Z98.890 (ICD-10-CM) - S/P carpal tunnel release  Encounter Diagnosis   Name Primary?    Right arm pain Yes     Physician: Tory Corley PA-C;  Dr. Bill Camejo    Physician Orders: G56.20 (ICD-10-CM) - Cubital tunnel syndrome, unspecified laterality, G56.01 (ICD-10-CM) - Carpal tunnel syndrome of right wrist, Z98.890 (ICD-10-CM) - S/P carpal tunnel release; evaluate and treat  Medical Diagnosis: G56.20 (ICD-10-CM) - Cubital tunnel syndrome, unspecified laterality, G56.01 (ICD-10-CM) - Carpal tunnel syndrome of right wrist, Z98.890 (ICD-10-CM) - S/P carpal tunnel release  Surgical Procedure and Date: s/p R elbow ulnar nerve transposition subcutaneous & R carpal tunnel release, 12/07/2022    Insurance Authorization Period Expiration: 01/02/2023-12/31/2023    Plan of Care Certification Period: 02/01/2022-05/01/2023  Date of Return to MD: 03/15/2023    Evaluation FOTO:  12/29/2022 = 59%  Reassessment FOTO: 02/01/2023 = 48%    Visit # / Visits authorized: 8 / 20   Time In:  10:20  Time Out: 11:00  Total Billable Time: 40 minutes    Precautions:  Standard      Post Op:   8 weeks,  5 days      Subjective     Patient reports: "I am not having too much pain today.  The lump on my elbow is still giving me problems.  I am having trouble lifting myself up from a chair."    3/10 pain   (Right) elbow  //   3/10  (Right) wrist    Objective    Patient seen by Occupational Therapy this session. Tx consisted of:        Therapeutic Activities  to improve functional performance while increasing strength, endurance, range of motion,  and flexibility  x   40  minutes:    -Fluidotherapy to  (Right)   hand to increase blood flow, circulation, " "tissue elasticity, desensitization, sensory re-education and for pain management  x  8  minutes.       -Ultrasound applied to volar aspect of (Right) wrist  on 3.0 Mhz with 0.7 w/cm2 @ 100 % duty cycle to decrease pain and inflammation  /////  to remold scar tissue and increase tissue elasticity x 8 minutes.      -Scar Massage to volar aspect of (Right) wrist region  with  mini therapeutic vibrator and scar extractor to decrease dense scar adhesions and improve tensile glide  x 2 minutes.  -Manual Therapy techniques to   (Right) wrist  including joint mobilizations, stretching, and Passive Range of Motion to increase joint mobility, range of motion  and for pain management  x  4 minutes   -Soft Tissue Massage to volar aspect of (Right) wrist region to increase blood flow for healing x 1 minute       - 2# Bicep curls for elbow Extension and flexion ( Supination  /neutral /Pronation) x 10 repetitions each -NP  - 0# Dorsiflexion/Volar flexion (Supination /Pronation ), Radial Deviation / Ulnar Deviation x 10 repetitions -NP  - Wrist roller coaster for Active Range of Motion  of wrist in all planes x 5 repetitions -NP   - Weighted ball on tabletop for Dorsiflexion /Volar Flexion  stretches x 20 repetitions -NP  - Wrist wheel for Supination / Pronation with YELLOW Theraband x 10 repetitions   - RED Theraputty for rolling x 20 repetitions -NP  - 35# Progressive Hand Gripper for composite  x 20 repetitions    - 100# Precor for seated dips x 20 repetitions   - Wooden pegboard with GREEN clothespins with 3PT pinch x 2 repetitions  - 1# dowel for Supination /Pronation  x 10 repetitions    - BLACK Theraband in standing for Tricep press and bicep curls x 20 repetitions         -NP = Not Performed    Initiate in future therapy sessions:       - ### Theraputty for pulling, PVC for "cookie cutting" and medicine top x 10  Repetitions   - ### digiflex for isolated x 10 repetitions;  ### digiflex for composite x 20 repetitions " "   - YELLOW digiweb for composite digital Extension and  intrinsics x 20 repetitions       - ### Flexbar for Supination /Pronation  and Dorsiflexion /Volar Flexion x 20 repetitions    - ### Wrist exerstick in standing for Dorsiflexion / Volar Flexion  x 3 repetitions         Assessment     Patient will continue to benefit from skilled Occupational Therapy intervention to increase functional abilities, range of motion, and strength and pain control.  Patient. demonstrated proper understanding of each exercise.  Patient continues to require verbal and tactile cues for throughout therapy session to maintain position and prevent compensation.   Patient continues to be limited in functional and leisurely pursuits. Pain limits patient's participation in activities of daily living.  Patient is not able to carryout necessary vocational tasks.   Patient's spiritual, cultural and educational needs considered and patient agreeable to plan of care and goals.  Patient is making good progress towards established goals.  Patient tolerated exercises within pain tolerance.   Reviewed Home Exercise Program with patient., see EPIC under "patient instructions" for provided exercises, activity modifications and limitations, modalities for home pain management.  Patient. demo understanding of above.    Patient. tolerated Fluidotherapy  & Ultrasound  with no irritation.     Patient tolerated addition of Precor for seated dips with no reported pain.            New/Revised Goals: Continue Plan Of Care   Goals:  1)   Patient to be Independent with Home exercise program and modalities for pain management by 1 week. (MET)  2)   Patient will report   3/10 (Right) elbow and 3/10 (Right) wrist pain on average with activity to assist with exercises by 6-8 weeks.  (In Progress)    3)   Patient will increase range of Motion by 3-5 degrees to increase functional use for activities of daily living by 6-8 weeks.  (In Progress)  4)   Patient will " decrease edema by 0.1-0.3 millimeters  in (Right) Proximal Wrist Crease to increase joint mobility /flexibility by 6-8 weeks. (MET)   5)   Patient will increase manual muscle testing by a grade to assist with lifting items by 6-8 weeks.   ( Added 02/01/2023 )   6)   Patient will increase  strength 3-5 pounds to open containers by 6-8 weeks.  ( Added 02/01/2023 )   7)   Patient will increase pinch by 1-3 pounds for buttoning by 6-8 weeks.  ( Added 02/01/2023 )     Plan      Continue 2x week during the 90 day certification period    02/01/2023  to 05/01/2023   with established Plan Of Care in pursuit of Occupational Therapy goals.      Brigida Trammell, OT

## 2023-02-01 NOTE — PLAN OF CARE
"Patient reassessed and new plan of care established.  Please sign and return if in agreement.    Thank you,  AUDREY Isbell                                 Occupational Therapy Progress Note       Date: 2/1/2023  Name: Donal Black  Clinic Number: [id number]    Therapy Diagnosis: G56.20 (ICD-10-CM) - Cubital tunnel syndrome, unspecified laterality, G56.01 (ICD-10-CM) - Carpal tunnel syndrome of right wrist, Z98.890 (ICD-10-CM) - S/P carpal tunnel release  Encounter Diagnosis   Name Primary?    Right arm pain Yes     Physician: Tory Corley PA-C;  Dr. Bill Camejo    Physician Orders: G56.20 (ICD-10-CM) - Cubital tunnel syndrome, unspecified laterality, G56.01 (ICD-10-CM) - Carpal tunnel syndrome of right wrist, Z98.890 (ICD-10-CM) - S/P carpal tunnel release; evaluate and treat  Medical Diagnosis: G56.20 (ICD-10-CM) - Cubital tunnel syndrome, unspecified laterality, G56.01 (ICD-10-CM) - Carpal tunnel syndrome of right wrist, Z98.890 (ICD-10-CM) - S/P carpal tunnel release  Surgical Procedure and Date: s/p R elbow ulnar nerve transposition subcutaneous & R carpal tunnel release, 12/07/2022    Insurance Authorization Period Expiration: 01/02/2023-12/31/2023    Plan of Care Certification Period: 02/01/2022-05/01/2023  Date of Return to MD: 03/15/2023    Evaluation FOTO:  12/29/2022 = 59%  Reassessment FOTO: 02/01/2023 = 48%    Visit # / Visits authorized: 7 / 20   Time In:  11:10  Time Out: 11:55  Total Billable Time: 45 minutes    Precautions:  Standard      Post Op:   8 weeks,  0 days      Subjective     Patient reports: "I built 2 garden boxes over the weekend and I am pretty sore today in my elbow and hand.  My hand still feels stiff and the lump on my elbow is still very tender.  I used my hand a lot yesterday so my pain is back up to 5/10.  When I go to push up like out of a seat I have pain in my hand and elbow.  Also when I go to throw stuff with my (Right) hand."    5/10 pain   (Right) elbow  //   " 5/10  (Right) wrist    Objective    Patient seen by Occupational Therapy this session. Tx consisted of:        Therapeutic Activities  to improve functional performance while increasing strength, endurance, range of motion,  and flexibility  x   45  minutes:    -Fluidotherapy to  (Right)   hand to increase blood flow, circulation, tissue elasticity, desensitization, sensory re-education and for pain management  x  8  minutes.       -Ultrasound applied to volar aspect of (Right) wrist  on 3.0 Mhz with 0.7 w/cm2 @ 100 % duty cycle to decrease pain and inflammation  /////  to remold scar tissue and increase tissue elasticity x 8 minutes.      -Scar Massage to volar aspect of (Right) wrist region  with  mini therapeutic vibrator and scar extractor to decrease dense scar adhesions and improve tensile glide  x 2 minutes.  -Manual Therapy techniques to   (Right) wrist  including joint mobilizations, stretching, and Passive Range of Motion to increase joint mobility, range of motion  and for pain management  x  4 minutes   -Soft Tissue Massage to volar aspect of (Right) wrist region to increase blood flow for healing x 1 minute              Patient reassessed as follows:    Edema:   centimeters (Right) / (Left)  Elbow Crease:  29.5 (-0.1)   / 30.0  Proximal Wrist Crease: 17.4 (-0.4)  / 16.9                                                            Active Range of Motion: elbow/wrist                         (Right)   //  (Left)  Elbow Extension/Flexion: 0 / 135  //  0/137  Dorsal Flexion /Volar Flexion: 55 (+5) / 70 (+15)   //  75/80  Radial Deviation /Ulnar Deviation:  12 (+5)  / 25 (+9)   //  20/40  Supination / Pronation:  75 (+20) / 70 (+10)  //   90/85     Comments:  patient reports stretching pain with Active range of motion for (Right) wrist Ulnar Deviation, and Dorsal Flexion.         Passive Range of Motion: elbow/wrist                         (Right)    Elbow Extension/Flexion:  WNL  Dorsal Flexion /Volar Flexion:  " 65 (+5)  / 70 (+5)   Radial Deviation /Ulnar Deviation:  20  / 30 (+5)    Supination / Pronation:  80 (+10) / 80 (+10)      Manual Muscle Test:  Elbow / Wrist          (Right)        Elbow Flexion:   5/5     Elbow Extension:  5/5  Dorsal Flexion:  3/5  Volar Flexion:  3/5  Radial Deviation:  3+/5  Ulnar Deviation:  3+/5            Strength: (KHALIF Dynamometer in pounds),Position II  (Right):  30  (Left):    140     Pinch Strength: (Pinch Gauge in pounds)                      (Right) /  (Left)  Lateral Pinch:   24 / 30  3 Point Pinch:  14 / 30  2 Point Pinch:  15 / 20     Comments: pain reported with (Right) 3 Point Pinch strength.       Assessment     Patient will continue to benefit from skilled Occupational Therapy intervention to increase functional abilities, range of motion, and strength and pain control.  Patient. demonstrated proper understanding of each exercise.  Patient continues to require verbal and tactile cues for throughout therapy session to maintain position and prevent compensation.   Patient continues to be limited in functional and leisurely pursuits. Pain limits patient's participation in activities of daily living.  Patient is not able to carryout necessary vocational tasks.   Patient's spiritual, cultural and educational needs considered and patient agreeable to plan of care and goals.  Patient is making good progress towards established goals.  Patient tolerated exercises within pain tolerance.   Reviewed Home Exercise Program with patient., see EPIC under "patient instructions" for provided exercises, activity modifications and limitations, modalities for home pain management.  Patient. demo understanding of above.    Patient. tolerated Fluidotherapy  & Ultrasound  with no irritation.     Patient continues to demo tender lump about 2.5 cm from incision site and proximal of  (Right) medial epicondyle.      Patient demo decreased edema in (Right) elbow crease, and (Right) Proximal Wrist " Crease. Patient demo increased Active range of motion for  (Right) wrist Dorsal Flexion, Volar Flexion, Radial Deviation, Ulnar Deviation, Supination and Pronation    ; Passive range of motion for (Right) wrist Dorsal Flexion, Volar Flexion, Ulnar Deviation, Supination, and Pronation.       New/Revised Goals: Continue Plan Of Care   Goals:  1)   Patient to be Independent with Home exercise program and modalities for pain management by 1 week. (MET)  2)   Patient will report   3/10 (Right) elbow and 3/10 (Right) wrist pain on average with activity to assist with exercises by 6-8 weeks.  (In Progress)    3)   Patient will increase range of Motion by 3-5 degrees to increase functional use for activities of daily living by 6-8 weeks.  (In Progress)  4)   Patient will decrease edema by 0.1-0.3 millimeters  in (Right) Proximal Wrist Crease to increase joint mobility /flexibility by 6-8 weeks. (MET)   5)   Patient will increase manual muscle testing by a grade to assist with lifting items by 6-8 weeks.   ( Added 02/01/2023 )   6)   Patient will increase  strength 3-5 pounds to open containers by 6-8 weeks.  ( Added 02/01/2023 )   7)   Patient will increase pinch by 1-3 pounds for buttoning by 6-8 weeks.  ( Added 02/01/2023 )     Plan      Continue 2x week during the 90 day certification period    02/01/2023  to 05/01/2023   with established Plan Of Care in pursuit of Occupational Therapy goals.      Brigida Trammell, OT          I certify the need for these services furnished under this plan of treatment and while under my care    ____________________________________                         __________________  Physician/Referring Practitioner                                               Date of Signature

## 2023-02-06 ENCOUNTER — CLINICAL SUPPORT (OUTPATIENT)
Dept: REHABILITATION | Facility: HOSPITAL | Age: 28
End: 2023-02-06
Payer: MEDICAID

## 2023-02-06 DIAGNOSIS — M79.601 RIGHT ARM PAIN: Primary | ICD-10-CM

## 2023-02-06 PROCEDURE — 97530 THERAPEUTIC ACTIVITIES: CPT | Mod: PN

## 2023-02-08 NOTE — PROGRESS NOTES
"                                    Occupational Therapy Daily Treatment Note       Date: 2/14/2023  Name: Donal Black  Clinic Number: [id number]    Therapy Diagnosis: G56.20 (ICD-10-CM) - Cubital tunnel syndrome, unspecified laterality, G56.01 (ICD-10-CM) - Carpal tunnel syndrome of right wrist, Z98.890 (ICD-10-CM) - S/P carpal tunnel release  Encounter Diagnosis   Name Primary?    Right arm pain Yes     Physician: Tory Corley PA-C;  Dr. Bill Camejo    Physician Orders: G56.20 (ICD-10-CM) - Cubital tunnel syndrome, unspecified laterality, G56.01 (ICD-10-CM) - Carpal tunnel syndrome of right wrist, Z98.890 (ICD-10-CM) - S/P carpal tunnel release; evaluate and treat  Medical Diagnosis: G56.20 (ICD-10-CM) - Cubital tunnel syndrome, unspecified laterality, G56.01 (ICD-10-CM) - Carpal tunnel syndrome of right wrist, Z98.890 (ICD-10-CM) - S/P carpal tunnel release  Surgical Procedure and Date: s/p R elbow ulnar nerve transposition subcutaneous & R carpal tunnel release, 12/07/2022    Insurance Authorization Period Expiration: 01/02/2023-12/31/2023    Plan of Care Certification Period: 02/01/2022-05/01/2023  Date of Return to MD: 03/15/2023    Evaluation FOTO:  12/29/2022 = 59%  Reassessment FOTO: 02/01/2023 = 48%    Visit # / Visits authorized: 9 / 20   Time In:  10:45  Time Out: 11:25   Total Billable Time: 40 minutes    Precautions:  Standard      Post Op:   12/07/2022      Subjective     Patient reports: "I am feeling about the same today in my elbow and wrist.  I went to push myself off the ground over the weekend and I felt like my (Right) elbow hyperextended inward.  It did not feel good."    3/10 pain   (Right) elbow  //   3/10  (Right) wrist    Objective    Patient seen by Occupational Therapy this session. Tx consisted of:        Therapeutic Activities  to improve functional performance while increasing strength, endurance, range of motion,  and flexibility  x   40  minutes:    -Fluidotherapy to  " "(Right)   hand to increase blood flow, circulation, tissue elasticity, desensitization, sensory re-education and for pain management  x  8  minutes.       -Ultrasound applied to volar aspect of (Right) wrist  on 3.0 Mhz with 0.7 w/cm2 @ 100 % duty cycle to decrease pain and inflammation  /////  to remold scar tissue and increase tissue elasticity x 8 minutes.      -Scar Massage to volar aspect of (Right) wrist region  with  mini therapeutic vibrator and scar extractor to decrease dense scar adhesions and improve tensile glide  x 2 minutes.  -Manual Therapy techniques to   (Right) wrist  including joint mobilizations, stretching, and Passive Range of Motion to increase joint mobility, range of motion  and for pain management  x  4 minutes   -Soft Tissue Massage to volar aspect of (Right) wrist region to increase blood flow for healing x 1 minute       - 2# Bicep curls for elbow Extension and flexion ( Supination  /neutral /Pronation) x 10 repetitions each -NP  - 0# Dorsiflexion/Volar flexion (Supination /Pronation ), Radial Deviation / Ulnar Deviation x 10 repetitions -NP  - Wrist wheel for Supination / Pronation with YELLOW Theraband x 10 repetitions   - RED Theraputty for rolling x 20 repetitions -NP  - 35# Progressive Hand Gripper for composite  x 20 repetitions     - 100# Precor for seated dips x 20 repetitions -NP  - YELLOW Flexbar for Supination /Pronation  and Dorsiflexion /Volar Flexion x 20 repetitions   - Wooden pegboard with GREEN clothespins with 3PT pinch x 2 repetitions  - 1# dowel for Supination /Pronation  x 10 repetitions    - BLACK Theraband in standing for Tricep press and bicep curls x 20 repetitions         -NP = Not Performed    Initiate in future therapy sessions:       - ### Theraputty for pulling, PVC for "cookie cutting" and medicine top x 10  Repetitions   - ### digiflex for isolated x 10 repetitions;  ### digiflex for composite x 20 repetitions    - YELLOW digiweb for composite digital " "Extension and  intrinsics x 20 repetitions   - ### Wrist exerstick in standing for Dorsiflexion / Volar Flexion  x 3 repetitions         Assessment     Patient will continue to benefit from skilled Occupational Therapy intervention to increase functional abilities, range of motion, and strength and pain control.  Patient. demonstrated proper understanding of each exercise.  Patient continues to require verbal and tactile cues for throughout therapy session to maintain position and prevent compensation.   Patient continues to be limited in functional and leisurely pursuits. Pain limits patient's participation in activities of daily living.  Patient is not able to carryout necessary vocational tasks.   Patient's spiritual, cultural and educational needs considered and patient agreeable to plan of care and goals.  Patient is making good progress towards established goals.  Patient tolerated exercises within pain tolerance.   Reviewed Home Exercise Program with patient., see EPIC under "patient instructions" for provided exercises, activity modifications and limitations, modalities for home pain management.  Patient. demo understanding of above.    Patient. tolerated Fluidotherapy  & Ultrasound  with no irritation.     Patient tolerated addition of Flexbar for Supination / Pronation, Dorsal Flexion, Volar Flexion with no reported pain.            New/Revised Goals: Continue Plan Of Care   Goals:  1)   Patient to be Independent with Home exercise program and modalities for pain management by 1 week. (MET)  2)   Patient will report   3/10 (Right) elbow and 3/10 (Right) wrist pain on average with activity to assist with exercises by 6-8 weeks.  (In Progress)    3)   Patient will increase range of Motion by 3-5 degrees to increase functional use for activities of daily living by 6-8 weeks.  (In Progress)  4)   Patient will decrease edema by 0.1-0.3 millimeters  in (Right) Proximal Wrist Crease to increase joint mobility " /flexibility by 6-8 weeks. (MET)   5)   Patient will increase manual muscle testing by a grade to assist with lifting items by 6-8 weeks.   ( Added 02/01/2023 )   6)   Patient will increase  strength 3-5 pounds to open containers by 6-8 weeks.  ( Added 02/01/2023 )   7)   Patient will increase pinch by 1-3 pounds for buttoning by 6-8 weeks.  ( Added 02/01/2023 )     Plan      Continue 2x week during the 90 day certification period    02/01/2023  to 05/01/2023   with established Plan Of Care in pursuit of Occupational Therapy goals.      Brigida Trammell, OT

## 2023-02-14 ENCOUNTER — CLINICAL SUPPORT (OUTPATIENT)
Dept: REHABILITATION | Facility: HOSPITAL | Age: 28
End: 2023-02-14
Payer: MEDICAID

## 2023-02-14 ENCOUNTER — PATIENT MESSAGE (OUTPATIENT)
Dept: REHABILITATION | Facility: HOSPITAL | Age: 28
End: 2023-02-14

## 2023-02-14 DIAGNOSIS — M79.601 RIGHT ARM PAIN: Primary | ICD-10-CM

## 2023-02-14 PROCEDURE — 97530 THERAPEUTIC ACTIVITIES: CPT | Mod: PN

## 2023-02-16 NOTE — PROGRESS NOTES
"                                    Occupational Therapy Daily Treatment Note       Date: 2/24/2023  Name: Donal Black  Clinic Number: [id number]    Therapy Diagnosis: G56.20 (ICD-10-CM) - Cubital tunnel syndrome, unspecified laterality, G56.01 (ICD-10-CM) - Carpal tunnel syndrome of right wrist, Z98.890 (ICD-10-CM) - S/P carpal tunnel release  Encounter Diagnosis   Name Primary?    Right arm pain Yes     Physician: Tory Corley PA-C;  Dr. Bill Camejo    Physician Orders: G56.20 (ICD-10-CM) - Cubital tunnel syndrome, unspecified laterality, G56.01 (ICD-10-CM) - Carpal tunnel syndrome of right wrist, Z98.890 (ICD-10-CM) - S/P carpal tunnel release; evaluate and treat  Medical Diagnosis: G56.20 (ICD-10-CM) - Cubital tunnel syndrome, unspecified laterality, G56.01 (ICD-10-CM) - Carpal tunnel syndrome of right wrist, Z98.890 (ICD-10-CM) - S/P carpal tunnel release  Surgical Procedure and Date: s/p R elbow ulnar nerve transposition subcutaneous & R carpal tunnel release, 12/07/2022    Insurance Authorization Period Expiration: 01/02/2023-12/31/2023    Plan of Care Certification Period: 02/01/2022-05/01/2023  Date of Return to MD: 03/15/2023    Evaluation FOTO:  12/29/2022 = 59%  Reassessment FOTO: 02/01/2023 = 48%    Visit # / Visits authorized: 10 / 20   Time In:  11:00  Time Out: 11:40   Total Billable Time: 40 minutes    Precautions:  Standard      Post Op:   12/07/2022      Subjective     Patient reports: "I have been doing more with my hand but the pain is still there.  I am having different pains than before.  Its more like a throbbing pain after I use it a lot.  I did a lot in the yard over the last couple of days."     3/10 pain   (Right) elbow  //   3/10  (Right) wrist    Objective    Patient seen by Occupational Therapy this session. Tx consisted of:        Therapeutic Activities  to improve functional performance while increasing strength, endurance, range of motion,  and flexibility  x   40  " "minutes:    -Fluidotherapy to  (Right)   hand to increase blood flow, circulation, tissue elasticity, desensitization, sensory re-education and for pain management  x  8  minutes.       -Ultrasound applied to volar aspect of (Right) wrist  on 3.0 Mhz with 0.7 w/cm2 @ 100 % duty cycle to decrease pain and inflammation  /////  to remold scar tissue and increase tissue elasticity x 8 minutes.      -Scar Massage to volar aspect of (Right) wrist region  with  mini therapeutic vibrator and scar extractor to decrease dense scar adhesions and improve tensile glide  x 2 minutes.  -Manual Therapy techniques to   (Right) wrist  including joint mobilizations, stretching, and Passive Range of Motion to increase joint mobility, range of motion  and for pain management  x  4 minutes   -Soft Tissue Massage to volar aspect of (Right) wrist region to increase blood flow for healing x 1 minute       - 2# Bicep curls for elbow Extension and flexion ( Supination  /neutral /Pronation) x 10 repetitions each -NP  - 0# Dorsiflexion/Volar flexion (Supination /Pronation ), Radial Deviation / Ulnar Deviation x 10 repetitions -NP  - Wrist wheel for Supination / Pronation with YELLOW Theraband x 10 repetitions     - RED Theraputty for rolling x 20 repetitions -NP  - 35# Progressive Hand Gripper for composite  x 20 repetitions     - 100# Precor for seated dips x 20 repetitions -NP  - YELLOW Flexbar for Supination /Pronation  and Dorsiflexion /Volar Flexion x 20 repetitions   - Wooden pegboard with GREEN clothespins with 3PT pinch x 2 repetitions  - 1# dowel for Supination /Pronation  x 10 repetitions    - BLACK Theraband in standing for Tricep press and bicep curls x 20 repetitions   - RED Theraputty for pulling x 10 repetitions   - GREEN Theraputty for PVC for "cookie cutting" x 10 repetitions   - RED Theraputty for  medicine top x 10  Repetitions       -NP = Not Performed    Initiate in future therapy sessions:         - ### digiflex for " "isolated x 10 repetitions;  ### digiflex for composite x 20 repetitions    - YELLOW digiweb for composite digital Extension and  intrinsics x 20 repetitions   - ### Wrist exerstick in standing for Dorsiflexion / Volar Flexion  x 3 repetitions         Assessment     Patient will continue to benefit from skilled Occupational Therapy intervention to increase functional abilities, range of motion, and strength and pain control.  Patient. demonstrated proper understanding of each exercise.  Patient continues to require verbal and tactile cues for throughout therapy session to maintain position and prevent compensation.   Patient continues to be limited in functional and leisurely pursuits. Pain limits patient's participation in activities of daily living.  Patient is not able to carryout necessary vocational tasks.   Patient's spiritual, cultural and educational needs considered and patient agreeable to plan of care and goals.  Patient is making good progress towards established goals.  Patient tolerated exercises within pain tolerance.   Reviewed Home Exercise Program with patient., see EPIC under "patient instructions" for provided exercises, activity modifications and limitations, modalities for home pain management.  Patient. demo understanding of above.    Patient. tolerated Fluidotherapy  & Ultrasound  with no irritation.     Patient tolerated addition of Theraputty for pulling, PVC for "cookie cutting" and medicine top with no reported pain.            New/Revised Goals: Continue Plan Of Care   Goals:  1)   Patient to be Independent with Home exercise program and modalities for pain management by 1 week. (MET)  2)   Patient will report   3/10 (Right) elbow and 3/10 (Right) wrist pain on average with activity to assist with exercises by 6-8 weeks.  (In Progress)    3)   Patient will increase range of Motion by 3-5 degrees to increase functional use for activities of daily living by 6-8 weeks.  (In Progress)  4)   " Patient will decrease edema by 0.1-0.3 millimeters  in (Right) Proximal Wrist Crease to increase joint mobility /flexibility by 6-8 weeks. (MET)   5)   Patient will increase manual muscle testing by a grade to assist with lifting items by 6-8 weeks.   ( Added 02/01/2023 )   6)   Patient will increase  strength 3-5 pounds to open containers by 6-8 weeks.  ( Added 02/01/2023 )   7)   Patient will increase pinch by 1-3 pounds for buttoning by 6-8 weeks.  ( Added 02/01/2023 )     Plan      Continue 2x week during the 90 day certification period    02/01/2023  to 05/01/2023   with established Plan Of Care in pursuit of Occupational Therapy goals.      Brigida Trammell, OT

## 2023-02-24 ENCOUNTER — CLINICAL SUPPORT (OUTPATIENT)
Dept: REHABILITATION | Facility: HOSPITAL | Age: 28
End: 2023-02-24
Payer: MEDICAID

## 2023-02-24 DIAGNOSIS — M79.601 RIGHT ARM PAIN: Primary | ICD-10-CM

## 2023-02-24 PROCEDURE — 97530 THERAPEUTIC ACTIVITIES: CPT | Mod: PN

## 2023-03-14 ENCOUNTER — PATIENT MESSAGE (OUTPATIENT)
Dept: REHABILITATION | Facility: HOSPITAL | Age: 28
End: 2023-03-14
Payer: MEDICAID

## 2023-03-14 NOTE — PROGRESS NOTES
"                                Occupational Therapy Progress Note       Date: 3/16/2023  Name: Donal Black  Clinic Number: [id number]    Therapy Diagnosis: G56.20 (ICD-10-CM) - Cubital tunnel syndrome, unspecified laterality, G56.01 (ICD-10-CM) - Carpal tunnel syndrome of right wrist, Z98.890 (ICD-10-CM) - S/P carpal tunnel release  Encounter Diagnosis   Name Primary?    Right arm pain Yes     Physician: Tory Corley PA-C;  Dr. Bill Camejo    Physician Orders: G56.20 (ICD-10-CM) - Cubital tunnel syndrome, unspecified laterality, G56.01 (ICD-10-CM) - Carpal tunnel syndrome of right wrist, Z98.890 (ICD-10-CM) - S/P carpal tunnel release; evaluate and treat  Medical Diagnosis: G56.20 (ICD-10-CM) - Cubital tunnel syndrome, unspecified laterality, G56.01 (ICD-10-CM) - Carpal tunnel syndrome of right wrist, Z98.890 (ICD-10-CM) - S/P carpal tunnel release  Surgical Procedure and Date: s/p R elbow ulnar nerve transposition subcutaneous & R carpal tunnel release, 12/07/2022    Insurance Authorization Period Expiration: 03/16/2023-06/14/2023    Plan of Care Certification Period: 02/01/2022-05/01/2023  Date of Return to MD: 04/19/2023    Evaluation FOTO:  12/29/2022 = 59%  Reassessment FOTO: 02/01/2023 = 48%      Visit # / Visits authorized: 11 / 12  Time In:  10:00  Time Out: 10:40  Total Billable Time: 40 minutes    Precautions:  Standard      Post Op:   12/07/2022      Subjective     Patient reports: "I went to see the doctor yesterday and he released me to return back to work.  I am not ready to go back to work but I have to because I have child support.  The pain increases with weather changes.  I do not think that I can do much with my arm at work or around the house now.   I do not think I can do my work activities with my arm.  I was able to use it a lot more but I am sore for like 3 days.  I am still getting numbness and tingling in my arm."    7/10 pain   (Right) elbow  //   3/10  (Right) wrist    Objective "    Patient seen by Occupational Therapy this session. Tx consisted of:        Therapeutic Activities  to improve functional performance while increasing strength, endurance, range of motion,  and flexibility  x   40  minutes:      -Ultrasound applied to volar aspect of (Right) wrist  on 3.0 Mhz with 0.7 w/cm2 @ 100 % duty cycle to decrease pain and inflammation  /////  to remold scar tissue and increase tissue elasticity x 8 minutes.        - Discussed with patient in length about his progress thus far.  Educated patient on activity modifications,  importance of gradually increasing daily activity and work load to increase strength and endurance  to return to full work duty.  Patient demo understanding of above.     Patient reassessed as follows:                                   Active Range of Motion: elbow/wrist                         (Right)   //  (Left)  Elbow Extension/Flexion: 0 / 135  //  0/137  Dorsal Flexion /Volar Flexion: 60 (+5) / 75 (+5)   //  75/80  Radial Deviation /Ulnar Deviation:  25 (+13)  /  30 (+5)   //  20/40  Supination / Pronation:  85 (+10) / 75 (+5)  //   90/85       Passive Range of Motion: elbow/wrist                         (Right)    Elbow Extension/Flexion:  WNL  Dorsal Flexion /Volar Flexion:  75 (+10)  / 80 (+10)   Radial Deviation /Ulnar Deviation:  20  / 35 (+5)    Supination / Pronation:  90 (+10) / 85 (+5)          Manual Muscle Test:  Elbow / Wrist          (Right)        Elbow Flexion:   5/5     Elbow Extension:  5/5  Dorsal Flexion:  4/5  (+2)   Volar Flexion:  5/5 (+3)   Radial Deviation:  5/5  (+2)   Ulnar Deviation:  4/5  (+1)             Strength: (KHALIF Dynamometer in pounds),Position II  (Right):  100 (+70)  (Left):    140     Pinch Strength: (Pinch Gauge in pounds)                      (Right) /  (Left)  Lateral Pinch:   25 (+1)  / 30  3 Point Pinch:  24 (+10) / 30  2 Point Pinch:  17 (+2)  / 20         Assessment     Patient will continue to benefit from skilled  "Occupational Therapy intervention to increase functional abilities, range of motion, and strength and pain control.  Patient. demonstrated proper understanding of each exercise.  Patient continues to require verbal and tactile cues for throughout therapy session to maintain position and prevent compensation.   Patient continues to be limited in functional and leisurely pursuits. Pain limits patient's participation in activities of daily living.  Patient is not able to carryout necessary vocational tasks.   Patient's spiritual, cultural and educational needs considered and patient agreeable to plan of care and goals.  Patient is making good progress towards established goals.  Patient tolerated exercises within pain tolerance.   Reviewed Home Exercise Program with patient., see EPIC under "patient instructions" for provided exercises, activity modifications and limitations, modalities for home pain management.  Patient. demo understanding of above.    Patient. tolerated Ultrasound  with no irritation.     Patient missed 5 scheduled therapy appointments since last seen on 02/24/2023.  Patient was progressing well when seen last on 02/24/2023.  Patient stated that he was building garden boxes and increasing his daily activities around the house with minimal pain.  Upon arrival to therapy today, patient stated increased pain in (Right) elbow region and that he did not think he was unable to complete any work or daily activities.     Patient arrived to therapy session stating multiple different concerns about returning to work and why his (Right) upper extremity is not "back to normal".  Discussed in length with patient about the healing process and reassured him that he was cleared to push himself during daily activities as tolerated.  Patient requested to continue therapy until he resumes work.       Patient demo increased Active range of motion for (Right) wrist Dorsal Flexion, Volar Flexion, Radial Deviation, Ulnar " Deviation, Supination, and Pronation; increased Passive range of motion for (Right) wrist Dorsal Flexion, Volar Flexion, Ulnar Deviation, Supination, Pronation.  Patient demo increased Manual Muscle Testing for (Right) wrist Dorsal Flexion, Volar Flexion, Radial Deviation,  Ulnar Deviation.  Patient demo increased  strength, Lateral Pinch, 3 Point Pinch, and 2 Point Pinch  strength.      New/Revised Goals: Continue Plan Of Care   Goals:  1)   Patient to be Independent with Home exercise program and modalities for pain management by 1 week. (MET)  2)   Patient will report   5/10 (Right) elbow and 1/10 (Right) wrist pain on average with activity to assist with exercises by 6-8 weeks.  (Revised 03/16/2023)    3)   Patient will increase range of Motion for (Right) wrist Dorsal Flexion, Ulnar Deviation, Pronation  by 3-5 degrees to increase functional use for activities of daily living by 6-8 weeks.  (Revised 03/16/2023)  4)   Patient will decrease edema by 0.1-0.3 millimeters  in (Right) Proximal Wrist Crease to increase joint mobility /flexibility by 6-8 weeks. (MET)   5)   Patient will increase manual muscle testing for (Right) Dorsal Flexion, and Ulnar Deviation by a grade to assist with lifting items by 6-8 weeks.   (Revised 03/16/2023)   6)   Patient will increase  strength 3-5 pounds to open containers by 6-8 weeks.  ( In Progress )   7)   Patient will increase pinch by 1-3 pounds for buttoning by 6-8 weeks.  ( In Progress )     Plan      Continue 2x week during the 90 day certification period    02/01/2023  to 05/01/2023   with established Plan Of Care in pursuit of Occupational Therapy goals.      Brigida Trammell, OT

## 2023-03-15 ENCOUNTER — OFFICE VISIT (OUTPATIENT)
Dept: ORTHOPEDICS | Facility: CLINIC | Age: 28
End: 2023-03-15
Payer: MEDICAID

## 2023-03-15 VITALS — HEIGHT: 71 IN | BODY MASS INDEX: 25.8 KG/M2

## 2023-03-15 DIAGNOSIS — G56.01 CARPAL TUNNEL SYNDROME OF RIGHT WRIST: ICD-10-CM

## 2023-03-15 DIAGNOSIS — G56.21 CUBITAL TUNNEL SYNDROME ON RIGHT: Primary | ICD-10-CM

## 2023-03-15 PROCEDURE — 3008F BODY MASS INDEX DOCD: CPT | Mod: CPTII,,, | Performed by: ORTHOPAEDIC SURGERY

## 2023-03-15 PROCEDURE — 99999 PR PBB SHADOW E&M-EST. PATIENT-LVL II: CPT | Mod: PBBFAC,,, | Performed by: ORTHOPAEDIC SURGERY

## 2023-03-15 PROCEDURE — 99024 PR POST-OP FOLLOW-UP VISIT: ICD-10-PCS | Mod: ,,, | Performed by: ORTHOPAEDIC SURGERY

## 2023-03-15 PROCEDURE — 1160F PR REVIEW ALL MEDS BY PRESCRIBER/CLIN PHARMACIST DOCUMENTED: ICD-10-PCS | Mod: CPTII,,, | Performed by: ORTHOPAEDIC SURGERY

## 2023-03-15 PROCEDURE — 99212 OFFICE O/P EST SF 10 MIN: CPT | Mod: PBBFAC,PN | Performed by: ORTHOPAEDIC SURGERY

## 2023-03-15 PROCEDURE — 1160F RVW MEDS BY RX/DR IN RCRD: CPT | Mod: CPTII,,, | Performed by: ORTHOPAEDIC SURGERY

## 2023-03-15 PROCEDURE — 3008F PR BODY MASS INDEX (BMI) DOCUMENTED: ICD-10-PCS | Mod: CPTII,,, | Performed by: ORTHOPAEDIC SURGERY

## 2023-03-15 PROCEDURE — 1159F PR MEDICATION LIST DOCUMENTED IN MEDICAL RECORD: ICD-10-PCS | Mod: CPTII,,, | Performed by: ORTHOPAEDIC SURGERY

## 2023-03-15 PROCEDURE — 99024 POSTOP FOLLOW-UP VISIT: CPT | Mod: ,,, | Performed by: ORTHOPAEDIC SURGERY

## 2023-03-15 PROCEDURE — 99999 PR PBB SHADOW E&M-EST. PATIENT-LVL II: ICD-10-PCS | Mod: PBBFAC,,, | Performed by: ORTHOPAEDIC SURGERY

## 2023-03-15 PROCEDURE — 1159F MED LIST DOCD IN RCRD: CPT | Mod: CPTII,,, | Performed by: ORTHOPAEDIC SURGERY

## 2023-03-15 RX ORDER — IBUPROFEN 600 MG/1
600 TABLET ORAL EVERY 8 HOURS PRN
Qty: 90 TABLET | Refills: 1 | Status: SHIPPED | OUTPATIENT
Start: 2023-03-15

## 2023-03-15 RX ORDER — IBUPROFEN 600 MG/1
TABLET ORAL
COMMUNITY
Start: 2023-01-15 | End: 2023-03-15 | Stop reason: SDUPTHER

## 2023-03-15 RX ORDER — PREGABALIN 75 MG/1
75 CAPSULE ORAL 2 TIMES DAILY
Qty: 60 CAPSULE | Refills: 2 | Status: SHIPPED | OUTPATIENT
Start: 2023-03-15 | End: 2023-09-13

## 2023-03-15 NOTE — PROGRESS NOTES
"Subjective:      Patient ID: Donal Black is a 27 y.o. male.  Chief Complaint: Post-op Evaluation (Right CTR/ Ulnar ( Sx 12/7) c/o elbow numbness and pain)      HPI  Donal Black is a  27 y.o. male presenting today for post op visit.  He is s/p right carpal tunnel release and ulnar nerve decompression right elbow he is now about 3 months postop he is improved in terms of pain and numbness but occasionally has shooting pain in the right arm usually brought on by heavy activities   .     Review of patient's allergies indicates:   Allergen Reactions    Gabapentin Hallucinations         Current Outpatient Medications   Medication Sig Dispense Refill    ibuprofen (ADVIL,MOTRIN) 600 MG tablet Take 1 tablet (600 mg total) by mouth every 8 (eight) hours as needed for Pain. 90 tablet 1    ondansetron (ZOFRAN-ODT) 4 MG TbDL Take 1 tablet (4 mg total) by mouth every 12 (twelve) hours as needed (nausea). (Patient not taking: Reported on 3/15/2023) 14 tablet 0    pregabalin (LYRICA) 75 MG capsule Take 1 capsule (75 mg total) by mouth 2 (two) times daily. 60 capsule 2     No current facility-administered medications for this visit.       No past medical history on file.    Past Surgical History:   Procedure Laterality Date    CARPAL TUNNEL RELEASE Right 12/7/2022    Procedure: RELEASE, CARPAL TUNNEL;  Surgeon: Bill Camejo Jr., MD;  Location: [facility] OR;  Service: Orthopedics;  Laterality: Right;    ULNAR NERVE TRANSPOSITION Right 12/7/2022    Procedure: TRANSPOSITION, NERVE, ULNAR;  Surgeon: Bill Camejo Jr., MD;  Location: [facility] OR;  Service: Orthopedics;  Laterality: Right;       OBJECTIVE:   PHYSICAL EXAM:  Height: 5' 11" (180.3 cm)    Vitals:    03/15/23 1524   Height: 5' 11" (1.803 m)   PainSc:   9   PainLoc: Elbow     Ortho/SPM Exam  Examination right arm the incision well healed elbow and wrist   No significant swelling no evidence of infection  Range of motion elbow wrist fingers full  strength slightly " decreased sensation intact    RADIOGRAPHS:  None  Comments: I have personally reviewed the imaging and I agree with the above radiologist's report.    ASSESSMENT/PLAN:     IMPRESSION:  Status post right carpal tunnel release and ulnar nerve decompression    PLAN:  I recommended some compounding cream for topical use on the elbow and hand   Motrin as needed by mouth   Advance activities continue strengthening with a squeeze ball   He can return to full duty work    FOLLOW UP:  4-6 weeks    Disclaimer: This note has been generated using voice-recognition software. There may be typographical errors that have been missed during proof-reading.

## 2023-03-16 ENCOUNTER — CLINICAL SUPPORT (OUTPATIENT)
Dept: REHABILITATION | Facility: HOSPITAL | Age: 28
End: 2023-03-16
Payer: MEDICAID

## 2023-03-16 DIAGNOSIS — M79.601 RIGHT ARM PAIN: Primary | ICD-10-CM

## 2023-03-16 PROCEDURE — 97530 THERAPEUTIC ACTIVITIES: CPT | Mod: PN

## 2023-03-17 ENCOUNTER — TELEPHONE (OUTPATIENT)
Dept: ORTHOPEDICS | Facility: CLINIC | Age: 28
End: 2023-03-17
Payer: MEDICAID

## 2023-03-17 NOTE — TELEPHONE ENCOUNTER
----- Message from Uriel Negrete sent at 3/17/2023 11:38 AM CDT -----  Contact: pt  Type: Short term disability     Who Called: pt   Would the patient rather a call back or a response via [employer]? call  Best Call Back Number: [phone]  Additional Information:   Pt requesting a call back soon as possible    Pt stated email was sent to provider and  they are requesting additional information  It was sent since January   Pt stated he need paperwork completed before he go back to work next month

## 2023-03-17 NOTE — TELEPHONE ENCOUNTER
Spoke with patient. Fax number given to patient to have [facility] paperwork faxed to us.

## 2023-03-17 NOTE — TELEPHONE ENCOUNTER
----- Message from Jean Claude Skaggs sent at 3/17/2023 12:23 PM CDT -----  Contact: pt  Type: Requesting to speak with nurse        Who Called: PT  Regarding: claims department sending over by fax attending physician statement. Would like to speak to asa as soon as possible.   Would the patient rather a call back or a response via [facility]? Call back  Best Call Back Number: [phone]  Additional Information:

## 2023-03-28 ENCOUNTER — TELEPHONE (OUTPATIENT)
Dept: ORTHOPEDICS | Facility: CLINIC | Age: 28
End: 2023-03-28
Payer: MEDICAID

## 2023-03-28 NOTE — TELEPHONE ENCOUNTER
----- Message from Jean Claude Skaggs sent at 3/28/2023  2:22 PM CDT -----  Contact: pt  Type: Requesting to speak with nurse        Who Called: PT  Regarding: status of attending physician statement. Would like a call back as soon as possible.  Would the patient rather a call back or a response via [employer] [facility]? Call back  Best Call Back Number: [phone]  Additional Information:

## 2023-06-16 ENCOUNTER — PATIENT MESSAGE (OUTPATIENT)
Dept: PODIATRY | Facility: CLINIC | Age: 28
End: 2023-06-16
Payer: MEDICAID

## 2023-07-17 ENCOUNTER — HOSPITAL ENCOUNTER (EMERGENCY)
Facility: HOSPITAL | Age: 28
Discharge: HOME | End: 2023-07-17
Attending: EMERGENCY MEDICINE
Payer: MEDICAID

## 2023-07-17 VITALS
BODY MASS INDEX: 25.2 KG/M2 | OXYGEN SATURATION: 100 % | HEART RATE: 90 BPM | TEMPERATURE: 99 F | WEIGHT: 180 LBS | HEIGHT: 71 IN | RESPIRATION RATE: 18 BRPM | DIASTOLIC BLOOD PRESSURE: 90 MMHG | SYSTOLIC BLOOD PRESSURE: 146 MMHG

## 2023-07-17 DIAGNOSIS — Z77.098 CHEMICAL EXPOSURE: Primary | ICD-10-CM

## 2023-07-17 PROCEDURE — 96372 THER/PROPH/DIAG INJ SC/IM: CPT | Performed by: EMERGENCY MEDICINE

## 2023-07-17 PROCEDURE — 63600175 PHARM REV CODE 636 W HCPCS: Performed by: EMERGENCY MEDICINE

## 2023-07-17 PROCEDURE — 25000003 PHARM REV CODE 250: Performed by: EMERGENCY MEDICINE

## 2023-07-17 PROCEDURE — 99284 EMERGENCY DEPT VISIT MOD MDM: CPT | Mod: 25

## 2023-07-17 RX ORDER — PREDNISONE 50 MG/1
50 TABLET ORAL DAILY
Qty: 7 TABLET | Refills: 0 | Status: SHIPPED | OUTPATIENT
Start: 2023-07-17 | End: 2023-07-17 | Stop reason: SDUPTHER

## 2023-07-17 RX ORDER — ACETAMINOPHEN 500 MG
1000 TABLET ORAL
Status: COMPLETED | OUTPATIENT
Start: 2023-07-17 | End: 2023-07-17

## 2023-07-17 RX ORDER — DEXAMETHASONE SODIUM PHOSPHATE 4 MG/ML
10 INJECTION, SOLUTION INTRA-ARTICULAR; INTRALESIONAL; INTRAMUSCULAR; INTRAVENOUS; SOFT TISSUE
Status: COMPLETED | OUTPATIENT
Start: 2023-07-17 | End: 2023-07-17

## 2023-07-17 RX ORDER — PREDNISONE 50 MG/1
50 TABLET ORAL DAILY
Qty: 7 TABLET | Refills: 0 | Status: SHIPPED | OUTPATIENT
Start: 2023-07-17 | End: 2023-07-24

## 2023-07-17 RX ADMIN — IBUPROFEN 600 MG: 400 TABLET ORAL at 03:07

## 2023-07-17 RX ADMIN — ACETAMINOPHEN 1000 MG: 500 TABLET, FILM COATED ORAL at 03:07

## 2023-07-17 RX ADMIN — DEXAMETHASONE SODIUM PHOSPHATE 10 MG: 4 INJECTION, SOLUTION INTRA-ARTICULAR; INTRALESIONAL; INTRAMUSCULAR; INTRAVENOUS; SOFT TISSUE at 06:07

## 2023-07-17 NOTE — Clinical Note
"Donal Gasca" Wayne was seen and treated in our emergency department on 7/17/2023.  He may return to work on 07/18/2023.       If you have any questions or concerns, please don't hesitate to call.      Regina Riley RN    "

## 2023-07-17 NOTE — ED PROVIDER NOTES
Encounter Date: 7/17/2023       History     Chief Complaint   Patient presents with    Sore Throat     Sore throat since 2340 last night after grinding coating on pipe     Chief complaint is sore throat.  The patient was grinding down a pipe with green pain primary on it and after it noticed trouble swallowing.  He did not wear a mask while he was doing this and there was lot of dust.  He did this at 11:40 p.m. and now complains of a sharp feeling in his throat area next to his thyroid cartilage.  He has been drinking fluids but continues to have some discomfort      Review of patient's allergies indicates:   Allergen Reactions    Gabapentin Hallucinations     No past medical history on file.  Past Surgical History:   Procedure Laterality Date    CARPAL TUNNEL RELEASE Right 12/7/2022    Procedure: RELEASE, CARPAL TUNNEL;  Surgeon: Bill Camejo Jr., MD;  Location: [facility];  Service: Orthopedics;  Laterality: Right;    ULNAR NERVE TRANSPOSITION Right 12/7/2022    Procedure: TRANSPOSITION, NERVE, ULNAR;  Surgeon: Bill Camejo Jr., MD;  Location: [facility] OR;  Service: Orthopedics;  Laterality: Right;     Family History   Problem Relation Age of Onset    Hypertension Father     Thyroid disease Mother      Social History     Tobacco Use    Smoking status: Some Days    Smokeless tobacco: Never   Substance Use Topics    Alcohol use: Yes     Alcohol/week: 5.0 standard drinks     Types: 5 Shots of liquor per week     Comment: Weekends only    Drug use: No     Review of Systems   Constitutional:  Negative for chills and fever.   HENT:  Positive for sore throat. Negative for ear pain and rhinorrhea.    Eyes:  Negative for pain and visual disturbance.   Respiratory:  Negative for cough and shortness of breath.    Cardiovascular:  Negative for chest pain and palpitations.   Gastrointestinal:  Negative for abdominal pain, constipation, diarrhea, nausea and vomiting.   Genitourinary:  Negative for dysuria, frequency, hematuria  and urgency.   Musculoskeletal:  Negative for back pain, joint swelling and myalgias.   Skin:  Negative for rash.   Neurological:  Negative for dizziness, seizures, weakness and headaches.   Psychiatric/Behavioral:  Negative for dysphoric mood. The patient is not nervous/anxious.      Physical Exam     Initial Vitals [07/17/23 0301]   BP Pulse Resp Temp SpO2   (!) 143/99 103 18 98.7 °F (37.1 °C) 100 %      MAP       --         Physical Exam    Nursing note and vitals reviewed.  Constitutional: He appears well-developed and well-nourished.   HENT:   Head: Normocephalic and atraumatic.   Posterior pharynx tonsillar area reddened and inflamed no definite swelling noted.  Patient is speaking well no drooling no tripod   Eyes: Conjunctivae, EOM and lids are normal. Pupils are equal, round, and reactive to light.   Neck: Trachea normal. Neck supple. No thyroid mass present.   Cardiovascular:  Normal rate, regular rhythm and normal heart sounds.           Pulmonary/Chest: Breath sounds normal. No respiratory distress.   Abdominal: Abdomen is soft. There is no abdominal tenderness.   Musculoskeletal:         General: Normal range of motion.      Cervical back: Neck supple.     Neurological: He is alert and oriented to person, place, and time. He has normal strength and normal reflexes. No cranial nerve deficit or sensory deficit.   Skin: Skin is warm and dry.   Psychiatric: He has a normal mood and affect. His speech is normal and behavior is normal. Judgment and thought content normal.       ED Course   Procedures  Labs Reviewed - No data to display       Imaging Results              X-Ray Chest AP Portable (Final result)  Result time 07/17/23 07:23:53      Final result by Montrell Navarro MD (07/17/23 07:23:53)                   Impression:      Negative chest.      Electronically signed by: Montrell Navarro MD  Date:    07/17/2023  Time:    07:23               Narrative:    EXAMINATION:  XR CHEST AP PORTABLE    CLINICAL  HISTORY:  cough;    FINDINGS:  Portable chest at 331 compared with 08/03/2019 shows normal cardiomediastinal silhouette.    Lungs are clear. Pulmonary vasculature is normal. No acute osseous abnormality.                                       Medications   acetaminophen tablet 1,000 mg (1,000 mg Oral Given 7/17/23 0317)   ibuprofen tablet 600 mg (600 mg Oral Given 7/17/23 0317)   dexAMETHasone injection 10 mg (10 mg Intramuscular Given 7/17/23 0609)     Medical Decision Making:   ED Management:  The patient states he scraped off with a scraper fusion bond epoxy and subsequent to that developed nasal drainage  sore throat which can be secondary to exposure to the fumes of this particular entity.  Inhalation treatment is to remove the person to  fresh air and if symptoms develop get medical attention which he did the patient has the symptoms of a mild cough nasal drainage minimal hoarseness and nose and throat pain.  These all symptoms seen from the inhalation of this particular product.  He is able to swallow however.  He is not drooling.  He is voice has not getting worse.  We did try a loss spray start which helped for several minutes for the discomfort but then returned.  He has no difficulty breathing.                        Clinical Impression:   Final diagnoses:  [Z77.098] Chemical exposure (Primary)        ED Disposition Condition    Discharge Stable          ED Prescriptions       Medication Sig Dispense Start Date End Date Auth. Provider    predniSONE (DELTASONE) 50 MG Tab  (Status: Discontinued) Take 1 tablet (50 mg total) by mouth once daily. for 7 days 7 tablet 7/17/2023 7/17/2023 Zelalem Carnes MD    predniSONE (DELTASONE) 50 MG Tab Take 1 tablet (50 mg total) by mouth once daily. for 7 days 7 tablet 7/17/2023 7/24/2023 Zelalem Carnes MD          Follow-up Information    None          Zelalem Carnes MD  07/18/23 0107

## 2024-02-19 ENCOUNTER — HOSPITAL ENCOUNTER (EMERGENCY)
Facility: HOSPITAL | Age: 29
Discharge: HOME | End: 2024-02-19
Attending: EMERGENCY MEDICINE

## 2024-02-19 VITALS
HEIGHT: 71 IN | TEMPERATURE: 98 F | HEART RATE: 108 BPM | DIASTOLIC BLOOD PRESSURE: 87 MMHG | RESPIRATION RATE: 16 BRPM | OXYGEN SATURATION: 99 % | BODY MASS INDEX: 25.9 KG/M2 | WEIGHT: 185 LBS | SYSTOLIC BLOOD PRESSURE: 150 MMHG

## 2024-02-19 DIAGNOSIS — S62.102A CLOSED FRACTURE OF LEFT WRIST, INITIAL ENCOUNTER: Primary | ICD-10-CM

## 2024-02-19 DIAGNOSIS — R52 PAIN: ICD-10-CM

## 2024-02-19 PROCEDURE — 99283 EMERGENCY DEPT VISIT LOW MDM: CPT | Mod: 25

## 2024-02-19 PROCEDURE — 29125 APPL SHORT ARM SPLINT STATIC: CPT | Mod: LT

## 2024-02-19 NOTE — ED PROVIDER NOTES
Encounter Date: 2/19/2024       History     Chief Complaint   Patient presents with    Wrist Pain     LEFT X  1 MOS, RE INJURY YESTERDAY     28-year-old male presents to the emergency department with complaint of left wrist pain that has been present for approximately 1 month.  Patient states that he injured his wrist while at work approximately 1 month ago reports that he felt it was getting better however he lifted some heavy corn 2 days ago and now his rest is hurting more.  Patient is right-hand dominant denies any further complaints      Review of patient's allergies indicates:   Allergen Reactions    Gabapentin Hallucinations     No past medical history on file.  Past Surgical History:   Procedure Laterality Date    CARPAL TUNNEL RELEASE Right 12/7/2022    Procedure: RELEASE, CARPAL TUNNEL;  Surgeon: Bill Camejo Jr., MD;  Location: [facility] OR;  Service: Orthopedics;  Laterality: Right;    ULNAR NERVE TRANSPOSITION Right 12/7/2022    Procedure: TRANSPOSITION, NERVE, ULNAR;  Surgeon: Bill Camejo Jr., MD;  Location: [facility] OR;  Service: Orthopedics;  Laterality: Right;     Family History   Problem Relation Age of Onset    Hypertension Father     Thyroid disease Mother      Social History     Tobacco Use    Smoking status: Some Days    Smokeless tobacco: Never   Substance Use Topics    Alcohol use: Yes     Alcohol/week: 5.0 standard drinks of alcohol     Types: 5 Shots of liquor per week     Comment: Weekends only    Drug use: No     Review of Systems   Musculoskeletal:         Left wrist pain    All other systems reviewed and are negative.      Physical Exam     Initial Vitals [02/19/24 1038]   BP Pulse Resp Temp SpO2   (!) 150/87 108 16 97.9 °F (36.6 °C) 99 %      MAP       --         Physical Exam    Nursing note and vitals reviewed.  Constitutional: He appears well-developed and well-nourished.   Musculoskeletal:         General: Tenderness and edema present.      Left wrist: Tenderness present. No bony  tenderness, snuff box tenderness or crepitus. Decreased range of motion. Normal pulse.           ED Course   Splint Application    Date/Time: 2/19/2024 12:49 PM    Performed by: Justyna Brewer FNP  Authorized by: Conner Louie MD  Location details: left wrist  Splint type: volar short arm  Supplies used: aluminum splint  Post-procedure: The splinted body part was neurovascularly unchanged following the procedure.  Patient tolerance: Patient tolerated the procedure well with no immediate complications        Labs Reviewed - No data to display       Imaging Results              X-Ray Hand 3 View Left (Final result)  Result time 02/19/24 11:26:39      Final result by Lui Vance MD (02/19/24 11:26:39)                   Narrative:    CLINICAL HISTORY:  28 years (1995) Male pain Pt injured left hand and wrist lifting heavy object    TECHNIQUE:  XR HAND 3 OR MORE VIEWS LEFT. 3 view(s) obtained .    COMPARISON:  None available.    FINDINGS:  Soft tissue swelling along the dorsal aspect of the wrist with no radiopaque foreign body identified. The hand shows no acute fracture or dislocation, and the joints and interspaces are maintained in the hand/fingers. See the radiograph of the wrist for further details.    IMPRESSION:  Osseous findings as above.                  .    Electronically signed by:  Lui Vance MD  02/19/2024 11:26 AM [facility] Workstation: [id number]                                     X-Ray Wrist Complete Left (Final result)  Result time 02/19/24 11:24:47      Final result by Lui Vance MD (02/19/24 11:24:47)                   Narrative:    CLINICAL HISTORY:  28 years (1995) Male Pt injured left hand and wrist lifting heavy object    TECHNIQUE:  XR WRIST 3 OR MORE VIEWS LEFT. 4 view(s) obtained .    COMPARISON:  None available.    FINDINGS:  Small ossific density along the dorsal, proximal carpal row seen on the lateral radiograph with associated soft tissue swelling  suggesting age-indeterminate occult triquetral injury. No radiopaque foreign body is identified. The joints and interspaces appear to be maintained. A punctate ossific density adjacent to the tip of the ulnar styloid process is suggestive of an enthesophyte and less likely a remote injury.    IMPRESSION:  Osseous findings as above.                  .    Electronically signed by:  Lui Vance MD  02/19/2024 11:24 AM [facility] Workstation: [id number]                                     Medications - No data to display  Medical Decision Making  28-year-old male presents to the emergency department with complaint of left wrist pain that has been present for approximately 1 month.  Patient states that he injured his wrist while at work approximately 1 month ago reports that he felt it was getting better however he lifted some heavy corn 2 days ago and now his rest is hurting more.  Patient is right-hand dominant denies any further complaints    Considerations include but not limited to, fracture, sprain, dislocation    28-year-old male presents emergency department with complaint of left wrist pain.  Patient states that his pain bother him for approximately 1 month, reports he thinks he may have injured worker proximally 1 month ago however re-injured it 2 days ago after lifting some heavy corn at work.  X-ray imaging of the hand is unremarkable.  X-ray imaging of the wrist revealsmall ossific density along the dorsal, proximal carpal row seen on the lateral radiograph with associated soft tissue swelling suggesting age-indeterminate occult triquetral injury.  Patient was placed in a Velcro wrist splint he has seen Dr. Peña in the past and will be referred back.  Patient was given return precautions    Amount and/or Complexity of Data Reviewed  Radiology: ordered. Decision-making details documented in ED Course.                                      Clinical Impression:  Final diagnoses:  [R52] Pain  [S62.102A] Closed  fracture of left wrist, initial encounter (Primary)          ED Disposition Condition    Discharge Stable          ED Prescriptions    None       Follow-up Information       Follow up With Specialties Details Why Contact Info    Donald Peña MD Orthopedic Surgery, Surgery, Sports Medicine Schedule an appointment as soon as possible for a visit in 1 week  [address] [address]  [phone]               Justyna Brewer, [facility]  02/19/24 9607

## 2024-02-19 NOTE — Clinical Note
"Donal Black (Brandon) was seen and treated in our emergency department on 2/19/2024.  He may return with limitations on 02/20/2024.       Sincerely,           "

## 2024-02-19 NOTE — Clinical Note
"Donal Folron" Wayne was seen and treated in our emergency department on 2/19/2024.  He may return to work on 02/20/2024.       If you have any questions or concerns, please don't hesitate to call.      CAROLINE Linares"

## 2024-02-19 NOTE — Clinical Note
"Donal Black (Brandon) was seen and treated in our emergency department on 2/19/2024.  He may return with limitations on 02/20/2024.       Sincerely,      CAROLINE Linares"